# Patient Record
Sex: MALE | Employment: FULL TIME | URBAN - METROPOLITAN AREA
[De-identification: names, ages, dates, MRNs, and addresses within clinical notes are randomized per-mention and may not be internally consistent; named-entity substitution may affect disease eponyms.]

---

## 2024-04-26 ENCOUNTER — APPOINTMENT (OUTPATIENT)
Dept: RADIOLOGY | Facility: HOSPITAL | Age: 59
End: 2024-04-26

## 2024-04-26 ENCOUNTER — HOSPITAL ENCOUNTER (INPATIENT)
Facility: HOSPITAL | Age: 59
LOS: 3 days | Discharge: HOME | End: 2024-04-29
Attending: STUDENT IN AN ORGANIZED HEALTH CARE EDUCATION/TRAINING PROGRAM | Admitting: INTERNAL MEDICINE

## 2024-04-26 ENCOUNTER — APPOINTMENT (OUTPATIENT)
Dept: CARDIOLOGY | Facility: HOSPITAL | Age: 59
End: 2024-04-26

## 2024-04-26 DIAGNOSIS — I10 PRIMARY HYPERTENSION: ICD-10-CM

## 2024-04-26 DIAGNOSIS — I50.9 ACUTE HEART FAILURE, UNSPECIFIED HEART FAILURE TYPE (MULTI): ICD-10-CM

## 2024-04-26 DIAGNOSIS — R06.02 SHORTNESS OF BREATH: ICD-10-CM

## 2024-04-26 DIAGNOSIS — I50.9 HEART FAILURE (MULTI): Primary | ICD-10-CM

## 2024-04-26 DIAGNOSIS — R05.1 ACUTE COUGH: ICD-10-CM

## 2024-04-26 DIAGNOSIS — E78.2 MIXED HYPERLIPIDEMIA: ICD-10-CM

## 2024-04-26 DIAGNOSIS — I21.4 NSTEMI (NON-ST ELEVATED MYOCARDIAL INFARCTION) (MULTI): ICD-10-CM

## 2024-04-26 DIAGNOSIS — I50.43 CHF (CONGESTIVE HEART FAILURE), NYHA CLASS I, ACUTE ON CHRONIC, COMBINED (MULTI): ICD-10-CM

## 2024-04-26 PROBLEM — R06.01 ORTHOPNEA: Status: ACTIVE | Noted: 2024-04-26

## 2024-04-26 LAB
ANION GAP SERPL CALC-SCNC: 11 MMOL/L
AORTIC VALVE MEAN GRADIENT: 5 MMHG
AORTIC VALVE PEAK VELOCITY: 1.56 M/S
AV PEAK GRADIENT: 9.7 MMHG
AVA (PEAK VEL): 2.26 CM2
AVA (VTI): 2.25 CM2
BASOPHILS # BLD AUTO: 0.06 X10*3/UL (ref 0–0.1)
BASOPHILS NFR BLD AUTO: 0.5 %
BUN SERPL-MCNC: 16 MG/DL (ref 8–25)
CALCIUM SERPL-MCNC: 8.7 MG/DL (ref 8.5–10.4)
CHLORIDE SERPL-SCNC: 105 MMOL/L (ref 97–107)
CHOLEST SERPL-MCNC: 193 MG/DL (ref 133–200)
CHOLEST/HDLC SERPL: 4.2 {RATIO}
CO2 SERPL-SCNC: 24 MMOL/L (ref 24–31)
CREAT SERPL-MCNC: 1.1 MG/DL (ref 0.4–1.6)
EGFRCR SERPLBLD CKD-EPI 2021: 79 ML/MIN/1.73M*2
EJECTION FRACTION APICAL 4 CHAMBER: 25.7
EOSINOPHIL # BLD AUTO: 0.15 X10*3/UL (ref 0–0.7)
EOSINOPHIL NFR BLD AUTO: 1.3 %
ERYTHROCYTE [DISTWIDTH] IN BLOOD BY AUTOMATED COUNT: 14.6 % (ref 11.5–14.5)
EST. AVERAGE GLUCOSE BLD GHB EST-MCNC: 131 MG/DL
GLUCOSE BLD MANUAL STRIP-MCNC: 141 MG/DL (ref 74–99)
GLUCOSE BLD MANUAL STRIP-MCNC: 172 MG/DL (ref 74–99)
GLUCOSE BLD MANUAL STRIP-MCNC: 97 MG/DL (ref 74–99)
GLUCOSE SERPL-MCNC: 107 MG/DL (ref 65–99)
HBA1C MFR BLD: 6.2 %
HCT VFR BLD AUTO: 43.8 % (ref 41–52)
HDLC SERPL-MCNC: 46 MG/DL
HGB BLD-MCNC: 14.3 G/DL (ref 13.5–17.5)
IMM GRANULOCYTES # BLD AUTO: 0.04 X10*3/UL (ref 0–0.7)
IMM GRANULOCYTES NFR BLD AUTO: 0.3 % (ref 0–0.9)
LDLC SERPL CALC-MCNC: 128 MG/DL (ref 65–130)
LEFT ATRIUM VOLUME AREA LENGTH INDEX BSA: 29.2 ML/M2
LEFT VENTRICLE INTERNAL DIMENSION DIASTOLE: 5.86 CM (ref 3.5–6)
LEFT VENTRICULAR OUTFLOW TRACT DIAMETER: 2 CM
LV EJECTION FRACTION BIPLANE: 26 %
LYMPHOCYTES # BLD AUTO: 3.19 X10*3/UL (ref 1.2–4.8)
LYMPHOCYTES NFR BLD AUTO: 27.4 %
MCH RBC QN AUTO: 28.1 PG (ref 26–34)
MCHC RBC AUTO-ENTMCNC: 32.6 G/DL (ref 32–36)
MCV RBC AUTO: 86 FL (ref 80–100)
MITRAL VALVE E/A RATIO: 2.54
MITRAL VALVE E/E' RATIO: 22.67
MONOCYTES # BLD AUTO: 0.73 X10*3/UL (ref 0.1–1)
MONOCYTES NFR BLD AUTO: 6.3 %
NEUTROPHILS # BLD AUTO: 7.48 X10*3/UL (ref 1.2–7.7)
NEUTROPHILS NFR BLD AUTO: 64.2 %
NRBC BLD-RTO: 0 /100 WBCS (ref 0–0)
NT-PROBNP SERPL-MCNC: 1059 PG/ML (ref 0–177)
PLATELET # BLD AUTO: 244 X10*3/UL (ref 150–450)
POTASSIUM SERPL-SCNC: 3.7 MMOL/L (ref 3.4–5.1)
RBC # BLD AUTO: 5.09 X10*6/UL (ref 4.5–5.9)
RIGHT VENTRICLE FREE WALL PEAK S': 12.7 CM/S
RIGHT VENTRICLE PEAK SYSTOLIC PRESSURE: 60.8 MMHG
SODIUM SERPL-SCNC: 140 MMOL/L (ref 133–145)
TRICUSPID ANNULAR PLANE SYSTOLIC EXCURSION: 2.4 CM
TRIGL SERPL-MCNC: 94 MG/DL (ref 40–150)
TROPONIN T SERPL-MCNC: 15 NG/L
TROPONIN T SERPL-MCNC: 16 NG/L
TROPONIN T SERPL-MCNC: 16 NG/L
TROPONIN T SERPL-MCNC: 17 NG/L
WBC # BLD AUTO: 11.7 X10*3/UL (ref 4.4–11.3)

## 2024-04-26 PROCEDURE — 84484 ASSAY OF TROPONIN QUANT: CPT | Performed by: STUDENT IN AN ORGANIZED HEALTH CARE EDUCATION/TRAINING PROGRAM

## 2024-04-26 PROCEDURE — 71046 X-RAY EXAM CHEST 2 VIEWS: CPT

## 2024-04-26 PROCEDURE — 99221 1ST HOSP IP/OBS SF/LOW 40: CPT | Performed by: INTERNAL MEDICINE

## 2024-04-26 PROCEDURE — 2500000004 HC RX 250 GENERAL PHARMACY W/ HCPCS (ALT 636 FOR OP/ED): Performed by: STUDENT IN AN ORGANIZED HEALTH CARE EDUCATION/TRAINING PROGRAM

## 2024-04-26 PROCEDURE — 82947 ASSAY GLUCOSE BLOOD QUANT: CPT

## 2024-04-26 PROCEDURE — 2500000004 HC RX 250 GENERAL PHARMACY W/ HCPCS (ALT 636 FOR OP/ED): Performed by: INTERNAL MEDICINE

## 2024-04-26 PROCEDURE — 93306 TTE W/DOPPLER COMPLETE: CPT

## 2024-04-26 PROCEDURE — 93306 TTE W/DOPPLER COMPLETE: CPT | Performed by: INTERNAL MEDICINE

## 2024-04-26 PROCEDURE — 1200000002 HC GENERAL ROOM WITH TELEMETRY DAILY

## 2024-04-26 PROCEDURE — 2500000001 HC RX 250 WO HCPCS SELF ADMINISTERED DRUGS (ALT 637 FOR MEDICARE OP): Performed by: INTERNAL MEDICINE

## 2024-04-26 PROCEDURE — 83036 HEMOGLOBIN GLYCOSYLATED A1C: CPT | Performed by: INTERNAL MEDICINE

## 2024-04-26 PROCEDURE — 96372 THER/PROPH/DIAG INJ SC/IM: CPT | Performed by: INTERNAL MEDICINE

## 2024-04-26 PROCEDURE — 80048 BASIC METABOLIC PNL TOTAL CA: CPT | Performed by: STUDENT IN AN ORGANIZED HEALTH CARE EDUCATION/TRAINING PROGRAM

## 2024-04-26 PROCEDURE — 99285 EMERGENCY DEPT VISIT HI MDM: CPT | Mod: 25

## 2024-04-26 PROCEDURE — 85025 COMPLETE CBC W/AUTO DIFF WBC: CPT | Performed by: STUDENT IN AN ORGANIZED HEALTH CARE EDUCATION/TRAINING PROGRAM

## 2024-04-26 PROCEDURE — 83880 ASSAY OF NATRIURETIC PEPTIDE: CPT | Performed by: STUDENT IN AN ORGANIZED HEALTH CARE EDUCATION/TRAINING PROGRAM

## 2024-04-26 PROCEDURE — 36415 COLL VENOUS BLD VENIPUNCTURE: CPT | Performed by: STUDENT IN AN ORGANIZED HEALTH CARE EDUCATION/TRAINING PROGRAM

## 2024-04-26 PROCEDURE — 71046 X-RAY EXAM CHEST 2 VIEWS: CPT | Performed by: RADIOLOGY

## 2024-04-26 PROCEDURE — 2500000002 HC RX 250 W HCPCS SELF ADMINISTERED DRUGS (ALT 637 FOR MEDICARE OP, ALT 636 FOR OP/ED): Performed by: INTERNAL MEDICINE

## 2024-04-26 PROCEDURE — 96374 THER/PROPH/DIAG INJ IV PUSH: CPT

## 2024-04-26 PROCEDURE — 80061 LIPID PANEL: CPT | Performed by: INTERNAL MEDICINE

## 2024-04-26 PROCEDURE — 93005 ELECTROCARDIOGRAM TRACING: CPT

## 2024-04-26 RX ORDER — METFORMIN HYDROCHLORIDE 500 MG/1
500 TABLET ORAL
COMMUNITY

## 2024-04-26 RX ORDER — LOSARTAN POTASSIUM 50 MG/1
50 TABLET ORAL DAILY
Status: DISCONTINUED | OUTPATIENT
Start: 2024-04-26 | End: 2024-04-29 | Stop reason: HOSPADM

## 2024-04-26 RX ORDER — POLYETHYLENE GLYCOL 3350 17 G/17G
17 POWDER, FOR SOLUTION ORAL DAILY PRN
Status: DISCONTINUED | OUTPATIENT
Start: 2024-04-26 | End: 2024-04-29 | Stop reason: HOSPADM

## 2024-04-26 RX ORDER — CARVEDILOL 3.12 MG/1
3.12 TABLET ORAL
Status: DISCONTINUED | OUTPATIENT
Start: 2024-04-26 | End: 2024-04-29 | Stop reason: HOSPADM

## 2024-04-26 RX ORDER — ASPIRIN 81 MG/1
81 TABLET ORAL DAILY
Status: DISCONTINUED | OUTPATIENT
Start: 2024-04-27 | End: 2024-04-29 | Stop reason: HOSPADM

## 2024-04-26 RX ORDER — INSULIN LISPRO 100 [IU]/ML
0-10 INJECTION, SOLUTION INTRAVENOUS; SUBCUTANEOUS
Status: DISCONTINUED | OUTPATIENT
Start: 2024-04-26 | End: 2024-04-29 | Stop reason: HOSPADM

## 2024-04-26 RX ORDER — AMLODIPINE BESYLATE 5 MG/1
5 TABLET ORAL DAILY
Status: DISCONTINUED | OUTPATIENT
Start: 2024-04-26 | End: 2024-04-29 | Stop reason: HOSPADM

## 2024-04-26 RX ORDER — ACETAMINOPHEN 325 MG/1
650 TABLET ORAL EVERY 4 HOURS PRN
Status: DISCONTINUED | OUTPATIENT
Start: 2024-04-26 | End: 2024-04-29 | Stop reason: HOSPADM

## 2024-04-26 RX ORDER — LOSARTAN POTASSIUM 50 MG/1
50 TABLET ORAL DAILY
COMMUNITY

## 2024-04-26 RX ORDER — HYDRALAZINE HYDROCHLORIDE 20 MG/ML
5 INJECTION INTRAMUSCULAR; INTRAVENOUS EVERY 8 HOURS PRN
Status: DISCONTINUED | OUTPATIENT
Start: 2024-04-26 | End: 2024-04-29 | Stop reason: HOSPADM

## 2024-04-26 RX ORDER — ACETAMINOPHEN 650 MG/1
650 SUPPOSITORY RECTAL EVERY 4 HOURS PRN
Status: DISCONTINUED | OUTPATIENT
Start: 2024-04-26 | End: 2024-04-29 | Stop reason: HOSPADM

## 2024-04-26 RX ORDER — ATORVASTATIN CALCIUM 10 MG/1
10 TABLET, FILM COATED ORAL NIGHTLY
Status: DISCONTINUED | OUTPATIENT
Start: 2024-04-26 | End: 2024-04-26

## 2024-04-26 RX ORDER — DEXTROSE 50 % IN WATER (D50W) INTRAVENOUS SYRINGE
12.5
Status: DISCONTINUED | OUTPATIENT
Start: 2024-04-26 | End: 2024-04-29 | Stop reason: HOSPADM

## 2024-04-26 RX ORDER — GUAIFENESIN/DEXTROMETHORPHAN 100-10MG/5
5 SYRUP ORAL EVERY 4 HOURS PRN
Status: DISCONTINUED | OUTPATIENT
Start: 2024-04-26 | End: 2024-04-29 | Stop reason: HOSPADM

## 2024-04-26 RX ORDER — ONDANSETRON HYDROCHLORIDE 2 MG/ML
4 INJECTION, SOLUTION INTRAVENOUS EVERY 8 HOURS PRN
Status: DISCONTINUED | OUTPATIENT
Start: 2024-04-26 | End: 2024-04-29 | Stop reason: HOSPADM

## 2024-04-26 RX ORDER — DEXTROSE 50 % IN WATER (D50W) INTRAVENOUS SYRINGE
25
Status: DISCONTINUED | OUTPATIENT
Start: 2024-04-26 | End: 2024-04-29 | Stop reason: HOSPADM

## 2024-04-26 RX ORDER — ATORVASTATIN CALCIUM 40 MG/1
40 TABLET, FILM COATED ORAL NIGHTLY
Status: DISCONTINUED | OUTPATIENT
Start: 2024-04-26 | End: 2024-04-29 | Stop reason: HOSPADM

## 2024-04-26 RX ORDER — ACETAMINOPHEN 160 MG/5ML
650 SOLUTION ORAL EVERY 4 HOURS PRN
Status: DISCONTINUED | OUTPATIENT
Start: 2024-04-26 | End: 2024-04-29 | Stop reason: HOSPADM

## 2024-04-26 RX ORDER — NAPROXEN SODIUM 220 MG/1
325 TABLET, FILM COATED ORAL ONCE
Status: COMPLETED | OUTPATIENT
Start: 2024-04-26 | End: 2024-04-26

## 2024-04-26 RX ORDER — FAMOTIDINE 20 MG/1
20 TABLET, FILM COATED ORAL DAILY
Status: DISCONTINUED | OUTPATIENT
Start: 2024-04-26 | End: 2024-04-29 | Stop reason: HOSPADM

## 2024-04-26 RX ORDER — GUAIFENESIN 600 MG/1
600 TABLET, EXTENDED RELEASE ORAL EVERY 12 HOURS PRN
Status: DISCONTINUED | OUTPATIENT
Start: 2024-04-26 | End: 2024-04-29 | Stop reason: HOSPADM

## 2024-04-26 RX ORDER — ENOXAPARIN SODIUM 100 MG/ML
40 INJECTION SUBCUTANEOUS DAILY
Status: DISCONTINUED | OUTPATIENT
Start: 2024-04-26 | End: 2024-04-29 | Stop reason: HOSPADM

## 2024-04-26 RX ORDER — FUROSEMIDE 10 MG/ML
20 INJECTION INTRAMUSCULAR; INTRAVENOUS ONCE
Status: COMPLETED | OUTPATIENT
Start: 2024-04-26 | End: 2024-04-26

## 2024-04-26 RX ORDER — ONDANSETRON 4 MG/1
4 TABLET, ORALLY DISINTEGRATING ORAL EVERY 8 HOURS PRN
Status: DISCONTINUED | OUTPATIENT
Start: 2024-04-26 | End: 2024-04-29 | Stop reason: HOSPADM

## 2024-04-26 RX ADMIN — CARVEDILOL 3.12 MG: 3.12 TABLET, FILM COATED ORAL at 10:13

## 2024-04-26 RX ADMIN — INSULIN LISPRO 2 UNITS: 100 INJECTION, SOLUTION INTRAVENOUS; SUBCUTANEOUS at 12:37

## 2024-04-26 RX ADMIN — ATORVASTATIN CALCIUM 40 MG: 40 TABLET, FILM COATED ORAL at 20:17

## 2024-04-26 RX ADMIN — CARVEDILOL 3.12 MG: 3.12 TABLET, FILM COATED ORAL at 17:39

## 2024-04-26 RX ADMIN — ASPIRIN 325 MG: 81 TABLET, CHEWABLE ORAL at 10:12

## 2024-04-26 RX ADMIN — FAMOTIDINE 20 MG: 20 TABLET ORAL at 10:12

## 2024-04-26 RX ADMIN — AMLODIPINE BESYLATE 5 MG: 5 TABLET ORAL at 10:12

## 2024-04-26 RX ADMIN — ENOXAPARIN SODIUM 40 MG: 40 INJECTION SUBCUTANEOUS at 10:12

## 2024-04-26 RX ADMIN — LOSARTAN POTASSIUM 50 MG: 50 TABLET, FILM COATED ORAL at 09:15

## 2024-04-26 RX ADMIN — FUROSEMIDE 20 MG: 10 INJECTION, SOLUTION INTRAMUSCULAR; INTRAVENOUS at 05:52

## 2024-04-26 SDOH — ECONOMIC STABILITY: TRANSPORTATION INSECURITY
IN THE PAST 12 MONTHS, HAS LACK OF TRANSPORTATION KEPT YOU FROM MEETINGS, WORK, OR FROM GETTING THINGS NEEDED FOR DAILY LIVING?: PATIENT UNABLE TO ANSWER

## 2024-04-26 SDOH — ECONOMIC STABILITY: TRANSPORTATION INSECURITY
IN THE PAST 12 MONTHS, HAS THE LACK OF TRANSPORTATION KEPT YOU FROM MEDICAL APPOINTMENTS OR FROM GETTING MEDICATIONS?: PATIENT UNABLE TO ANSWER

## 2024-04-26 SDOH — HEALTH STABILITY: PHYSICAL HEALTH
ON AVERAGE, HOW MANY DAYS PER WEEK DO YOU ENGAGE IN MODERATE TO STRENUOUS EXERCISE (LIKE A BRISK WALK)?: PATIENT UNABLE TO ANSWER

## 2024-04-26 SDOH — ECONOMIC STABILITY: INCOME INSECURITY
HOW HARD IS IT FOR YOU TO PAY FOR THE VERY BASICS LIKE FOOD, HOUSING, MEDICAL CARE, AND HEATING?: PATIENT UNABLE TO ANSWER

## 2024-04-26 SDOH — ECONOMIC STABILITY: FOOD INSECURITY
WITHIN THE PAST 12 MONTHS, THE FOOD YOU BOUGHT JUST DIDN'T LAST AND YOU DIDN'T HAVE MONEY TO GET MORE.: PATIENT UNABLE TO ANSWER

## 2024-04-26 SDOH — HEALTH STABILITY: MENTAL HEALTH: HOW MANY STANDARD DRINKS CONTAINING ALCOHOL DO YOU HAVE ON A TYPICAL DAY?: PATIENT DOES NOT DRINK

## 2024-04-26 SDOH — ECONOMIC STABILITY: HOUSING INSECURITY: IN THE LAST 12 MONTHS, HOW MANY PLACES HAVE YOU LIVED?: 1

## 2024-04-26 SDOH — HEALTH STABILITY: PHYSICAL HEALTH: ON AVERAGE, HOW MANY MINUTES DO YOU ENGAGE IN EXERCISE AT THIS LEVEL?: PATIENT UNABLE TO ANSWER

## 2024-04-26 SDOH — ECONOMIC STABILITY: INCOME INSECURITY
IN THE LAST 12 MONTHS, WAS THERE A TIME WHEN YOU WERE NOT ABLE TO PAY THE MORTGAGE OR RENT ON TIME?: PATIENT UNABLE TO ANSWER

## 2024-04-26 SDOH — ECONOMIC STABILITY: FOOD INSECURITY
WITHIN THE PAST 12 MONTHS, YOU WORRIED THAT YOUR FOOD WOULD RUN OUT BEFORE YOU GOT MONEY TO BUY MORE.: PATIENT UNABLE TO ANSWER

## 2024-04-26 SDOH — SOCIAL STABILITY: SOCIAL INSECURITY: HAVE YOU HAD THOUGHTS OF HARMING ANYONE ELSE?: NO

## 2024-04-26 SDOH — SOCIAL STABILITY: SOCIAL INSECURITY: DOES ANYONE TRY TO KEEP YOU FROM HAVING/CONTACTING OTHER FRIENDS OR DOING THINGS OUTSIDE YOUR HOME?: NO

## 2024-04-26 SDOH — SOCIAL STABILITY: SOCIAL INSECURITY: ARE YOU OR HAVE YOU BEEN THREATENED OR ABUSED PHYSICALLY, EMOTIONALLY, OR SEXUALLY BY ANYONE?: NO

## 2024-04-26 SDOH — HEALTH STABILITY: MENTAL HEALTH: HOW OFTEN DO YOU HAVE A DRINK CONTAINING ALCOHOL?: NEVER

## 2024-04-26 SDOH — SOCIAL STABILITY: SOCIAL INSECURITY: HAS ANYONE EVER THREATENED TO HURT YOUR FAMILY OR YOUR PETS?: NO

## 2024-04-26 SDOH — SOCIAL STABILITY: SOCIAL INSECURITY: DO YOU FEEL UNSAFE GOING BACK TO THE PLACE WHERE YOU ARE LIVING?: NO

## 2024-04-26 SDOH — HEALTH STABILITY: MENTAL HEALTH: HOW OFTEN DO YOU HAVE 6 OR MORE DRINKS ON ONE OCCASION?: NEVER

## 2024-04-26 SDOH — SOCIAL STABILITY: SOCIAL INSECURITY: ARE THERE ANY APPARENT SIGNS OF INJURIES/BEHAVIORS THAT COULD BE RELATED TO ABUSE/NEGLECT?: NO

## 2024-04-26 SDOH — ECONOMIC STABILITY: HOUSING INSECURITY
IN THE LAST 12 MONTHS, WAS THERE A TIME WHEN YOU DID NOT HAVE A STEADY PLACE TO SLEEP OR SLEPT IN A SHELTER (INCLUDING NOW)?: PATIENT UNABLE TO ANSWER

## 2024-04-26 SDOH — SOCIAL STABILITY: SOCIAL INSECURITY: DO YOU FEEL ANYONE HAS EXPLOITED OR TAKEN ADVANTAGE OF YOU FINANCIALLY OR OF YOUR PERSONAL PROPERTY?: NO

## 2024-04-26 SDOH — HEALTH STABILITY: MENTAL HEALTH
STRESS IS WHEN SOMEONE FEELS TENSE, NERVOUS, ANXIOUS, OR CAN'T SLEEP AT NIGHT BECAUSE THEIR MIND IS TROUBLED. HOW STRESSED ARE YOU?: PATIENT UNABLE TO ANSWER

## 2024-04-26 SDOH — HEALTH STABILITY: MENTAL HEALTH
HOW OFTEN DO YOU NEED TO HAVE SOMEONE HELP YOU WHEN YOU READ INSTRUCTIONS, PAMPHLETS, OR OTHER WRITTEN MATERIAL FROM YOUR DOCTOR OR PHARMACY?: PATIENT UNABLE TO RESPOND

## 2024-04-26 SDOH — SOCIAL STABILITY: SOCIAL INSECURITY: ABUSE: ADULT

## 2024-04-26 SDOH — SOCIAL STABILITY: SOCIAL INSECURITY: WERE YOU ABLE TO COMPLETE ALL THE BEHAVIORAL HEALTH SCREENINGS?: YES

## 2024-04-26 SDOH — SOCIAL STABILITY: SOCIAL INSECURITY: HAVE YOU HAD ANY THOUGHTS OF HARMING ANYONE ELSE?: NO

## 2024-04-26 ASSESSMENT — ACTIVITIES OF DAILY LIVING (ADL)
BATHING: INDEPENDENT
BATHING: INDEPENDENT
DRESSING YOURSELF: INDEPENDENT
PATIENT'S MEMORY ADEQUATE TO SAFELY COMPLETE DAILY ACTIVITIES?: YES
HEARING - RIGHT EAR: FUNCTIONAL
FEEDING YOURSELF: INDEPENDENT
WALKS IN HOME: INDEPENDENT
FEEDING YOURSELF: INDEPENDENT
JUDGMENT_ADEQUATE_SAFELY_COMPLETE_DAILY_ACTIVITIES: YES
HEARING - LEFT EAR: FUNCTIONAL
JUDGMENT_ADEQUATE_SAFELY_COMPLETE_DAILY_ACTIVITIES: YES
PATIENT'S MEMORY ADEQUATE TO SAFELY COMPLETE DAILY ACTIVITIES?: YES
TOILETING: INDEPENDENT
HEARING - LEFT EAR: FUNCTIONAL
GROOMING: INDEPENDENT
WALKS IN HOME: INDEPENDENT
TOILETING: INDEPENDENT
GROOMING: INDEPENDENT
LACK_OF_TRANSPORTATION: PATIENT UNABLE TO ANSWER
DRESSING YOURSELF: INDEPENDENT
ADEQUATE_TO_COMPLETE_ADL: YES
ADEQUATE_TO_COMPLETE_ADL: YES
HEARING - RIGHT EAR: FUNCTIONAL

## 2024-04-26 ASSESSMENT — COGNITIVE AND FUNCTIONAL STATUS - GENERAL
DAILY ACTIVITIY SCORE: 24
MOBILITY SCORE: 24
PATIENT BASELINE BEDBOUND: NO
DAILY ACTIVITIY SCORE: 24
MOBILITY SCORE: 24
MOBILITY SCORE: 24
DAILY ACTIVITIY SCORE: 24

## 2024-04-26 ASSESSMENT — PAIN SCALES - GENERAL
PAINLEVEL_OUTOF10: 9
PAINLEVEL_OUTOF10: 0 - NO PAIN
PAINLEVEL_OUTOF10: 0 - NO PAIN

## 2024-04-26 ASSESSMENT — ENCOUNTER SYMPTOMS
MYALGIAS: 0
IRREGULAR HEARTBEAT: 0
DIZZINESS: 0
SHORTNESS OF BREATH: 1
PALPITATIONS: 0
NEAR-SYNCOPE: 0
ORTHOPNEA: 0
WEIGHT LOSS: 0
WHEEZING: 0
WEAKNESS: 0
WEIGHT GAIN: 0
COUGH: 0
CLAUDICATION: 0
PND: 0
SYNCOPE: 0
DIAPHORESIS: 0
DYSPNEA ON EXERTION: 1
FEVER: 0

## 2024-04-26 ASSESSMENT — LIFESTYLE VARIABLES
AUDIT-C TOTAL SCORE: 0
AUDIT-C TOTAL SCORE: 0
PRESCIPTION_ABUSE_PAST_12_MONTHS: NO
HOW OFTEN DO YOU HAVE A DRINK CONTAINING ALCOHOL: NEVER
HOW OFTEN DO YOU HAVE 6 OR MORE DRINKS ON ONE OCCASION: NEVER
SKIP TO QUESTIONS 9-10: 1
AUDIT-C TOTAL SCORE: 0
SUBSTANCE_ABUSE_PAST_12_MONTHS: NO
SKIP TO QUESTIONS 9-10: 1
HOW MANY STANDARD DRINKS CONTAINING ALCOHOL DO YOU HAVE ON A TYPICAL DAY: PATIENT DOES NOT DRINK

## 2024-04-26 ASSESSMENT — PATIENT HEALTH QUESTIONNAIRE - PHQ9
2. FEELING DOWN, DEPRESSED OR HOPELESS: NOT AT ALL
1. LITTLE INTEREST OR PLEASURE IN DOING THINGS: NOT AT ALL
SUM OF ALL RESPONSES TO PHQ9 QUESTIONS 1 & 2: 0

## 2024-04-26 ASSESSMENT — COLUMBIA-SUICIDE SEVERITY RATING SCALE - C-SSRS
2. HAVE YOU ACTUALLY HAD ANY THOUGHTS OF KILLING YOURSELF?: NO
1. IN THE PAST MONTH, HAVE YOU WISHED YOU WERE DEAD OR WISHED YOU COULD GO TO SLEEP AND NOT WAKE UP?: NO
6. HAVE YOU EVER DONE ANYTHING, STARTED TO DO ANYTHING, OR PREPARED TO DO ANYTHING TO END YOUR LIFE?: NO
1. IN THE PAST MONTH, HAVE YOU WISHED YOU WERE DEAD OR WISHED YOU COULD GO TO SLEEP AND NOT WAKE UP?: NO
2. HAVE YOU ACTUALLY HAD ANY THOUGHTS OF KILLING YOURSELF?: NO
6. HAVE YOU EVER DONE ANYTHING, STARTED TO DO ANYTHING, OR PREPARED TO DO ANYTHING TO END YOUR LIFE?: NO

## 2024-04-26 ASSESSMENT — PAIN - FUNCTIONAL ASSESSMENT: PAIN_FUNCTIONAL_ASSESSMENT: 0-10

## 2024-04-26 NOTE — H&P
History Of Present Illness      Tomas Hough is a 58 y.o. male presenting with Shortness of Breath.      Patient presented with shortness of breath.  He stated he felt like something was stuck in his throat.  This has been ongoing for over 1 week.  It seems to be worse when the patient lays down at night or when he is active.  Corwin has been wheezing as well.  He is otherwise healthy.  He does not smoke.      Patient's ED diagnostic workup noted for minimal leukocytosis of 11.7.  The H&H was stable at 14.3/43.8.  The platelet count was stable at 244.  The  differential count was unremarkable.  The blood chemistry was noted for a glucose of 107.  Otherwise no electrolyte aberrancies.  Patient's proBNP was 1059.  PA and lateral chest x-ray was noted for cardiomegaly.  Mild vascular congestion and interstitial edema.      Vital signs in the ED were noted for Tmax 37.2, pulse rate 89, respiratory 14, /78.  Saturating 94% on room air.       Patient's EKG noted for normal sinus rhythm at 77 bpm.  Left axis deviation.  Left bundle branch block.  This was per the ED physician        I used the [employer] device at the bedside in the emergency room with a  name [place] to have a conversation with the patient.        Past Medical History      Past Medical History:   Diagnosis Date    Hypertension          Surgical History        History reviewed. No pertinent surgical history.       Social History    He has no history on file for tobacco use, alcohol use, and drug use.    Ex-smoker he quit over 25 years ago.  He only smoked a few cigarettes per day    He will drink 1 beer occasionally throughout the week      Family History      Mother: Diabetes mellitus type 2  Father: Healthy       Allergies      Penicillin allergy he states he gets hives.            Review of Systems    14-point ROS otherwise negative, as per HPI/Interval History.    General: No change in weight. No weakenss. No Fevers/Chills/Night Sweats  "  Skin: No skin/hair/nail changes. No rashes or sores.  Head:  No trauma. No Headache/nasuea/vomitting.   Eyes: No visual changes. No tearing. No itching.   Ears: No hearing loss. No tinnitus. No vertigo. No discharge.  Nose, Sinuses: No rhinorrhea, No nasal congestion. No epistaxis.  Mouth, Throat, Neck: No bleeding gums, hoarseness, sore throat or swollen neck  Cardiac: No palpitations. No PERES. No PND. No Orthopnea.   Respiratory: Yes Shortness of Breath. No wheezing. Yes cough. No hemoptysis.   GI: No nausea/vomiting. No indigestion. No diarrhea. No constipation.   Extremities: No numbness or tingling. No paresthesias.   Urinary: No change in urinary frequency. No change in hesitancy. No hematuria. No incontinence.           Physical Exam        Constitutional:  Pleasant  Eyes: PERRL, EOMI,   ENMT: mucous membranes moist  Head/Neck: Neck supple, Minimal JVD,   Respiratory/Thorax: Fine crackles in the bases  Cardiovascular: Regular, rate and rhythm, no murmurs.  Orthopnea   Gastrointestinal: Soft, non-distended, +BS.  Musculoskeletal: ROM intact, no joint swelling, normal strength  Extremities: peripheral pulses intact; no edema  Neurological: Alert and Oriented x 3; no focal deficits; gross motor and sensation intact; CN II-XII intact. No asterixis.  Psychological: Appropriate mood and behavior  Skin: No lesions, No rashes.         Last Recorded Vitals  Blood pressure 162/76, pulse 79, temperature 37.2 °C (99 °F), temperature source Oral, resp. rate 17, height 1.727 m (5' 8\"), weight 81.6 kg (180 lb), SpO2 99%.    Relevant Results    Lab Results   Component Value Date    WBC 11.7 (H) 04/26/2024    HGB 14.3 04/26/2024    HCT 43.8 04/26/2024    MCV 86 04/26/2024     04/26/2024       Lab Results   Component Value Date    GLUCOSE 107 (H) 04/26/2024    CALCIUM 8.7 04/26/2024     04/26/2024    K 3.7 04/26/2024    CO2 24 04/26/2024     04/26/2024    BUN 16 04/26/2024    CREATININE 1.10 04/26/2024 " "      No results found for: \"HGBA1C\"      No CT head results found for the past 12 months      Scheduled medications  amLODIPine, 5 mg, oral, Daily      Continuous medications     PRN medications  PRN medications: hydrALAZINE        Assessment/Plan   Principal Problem:    CHF (congestive heart failure), NYHA class I, acute on chronic, combined (Multi)  Active Problems:    Orthopnea    HTN (hypertension)        Tomas Hough is a 58 y.o. male presenting with Shortness of Breath.  Patient admitted for further evaluation and management.        Decompensated Heart Failure -patient complains of orthopnea      Admit patient to Telemetry Services  Chest x-ray reviewed  Will administer diuretics Lasix 20 mg IVP Q Daily.  Obtain TTE to evaluate for most recent LVEF and to asses for any valvular deformities, or regional wall motion defects.  Low Sodium Diet  Fluid Restriction < 1.5 Liter/day  Daily Weights  Strict I's + O's  Cardiac Diet   EKG reviewed  Follow-up lipid panel and hemoglobin A1c level      Left bundle branch block      No previous EKG to compare with  Denies any active chest pain      Uncontrolled Hypertension      Continue to monitor BP and adjust hypertensive medications accordingly  Will start patient amlodipine 5 mg p.o. daily      GI + DVT PPX      H2 blocker  Lovenox subcu          This Dictation was Transcribed using a Nuance Dragon Voice Recognition System Device (with Compatible Computer + Software) and as such may contain Grammatical Errors and Unintentional Typing Misprints.      I spent 31 minutes in the professional and overall care of this patient.      Andrew Mayers MD      "

## 2024-04-26 NOTE — CONSULTS
"Nutrition Assessement Note    Nutrition Assessment    Reason for Assessment: Dietitian discretion (CHF)    Reason for Hospital Admission:  Tomas Hough is a 58 y.o. male who is admitted for CHF. Bolivian speaking - use of virtual  during this visit. Via intrepeter, pt asked if handouts were paperwork he needed to fill out. When asked if he could read the informaiton he answered yes.     Nutrition History:  Food and Nutrient History: reports good appetite. adds a little salt to his foods at home.  Energy Intake: Good > 75 %    Anthropometrics:  Ht: 172.7 cm (5' 8\"), Wt: 81.6 kg (180 lb), BMI: 27.38  IBW/kg (Dietitian Calculated): 70 kg  Percent of IBW: 117 %    Weight Change:  Daily Weight  04/26/24 : 81.6 kg (180 lb)     Weight History / % Weight Change: denies wt changes but reports usual wt 209#    Nutrition Focused Physical Exam Findings:   Subcutaneous Fat Loss  Orbital Fat Pads: Well nourished (slightly bulging fat pads)  Buccal Fat Pads: Well nourished (full, rounded cheeks)    Muscle Wasting  Temporalis: Well nourished (well-defined muscle)  Pectoralis (Clavicular Region): Well nourished (clavicle not visible)  Deltoid/Trapezius: Well nourished (rounded appearance at arm, shoulder, neck)    Nutrition Significant Labs:  Lab Results   Component Value Date    WBC 11.7 (H) 04/26/2024    HGB 14.3 04/26/2024    HCT 43.8 04/26/2024     04/26/2024    CHOL 193 04/26/2024    TRIG 94 04/26/2024    HDL 46.0 04/26/2024     04/26/2024    K 3.7 04/26/2024     04/26/2024    CREATININE 1.10 04/26/2024    BUN 16 04/26/2024    CO2 24 04/26/2024    HGBA1C 6.2 (H) 04/26/2024     Nutrition Specific Medications:  amLODIPine, 5 mg, oral, Daily  [START ON 4/27/2024] aspirin, 81 mg, oral, Daily  atorvastatin, 40 mg, oral, Nightly  carvedilol, 3.125 mg, oral, BID with meals  enoxaparin, 40 mg, subcutaneous, Daily  famotidine, 20 mg, oral, Daily  insulin lispro, 0-10 Units, subcutaneous, TID with " meals  losartan, 50 mg, oral, Daily  perflutren lipid microspheres, 0.5-10 mL of dilution, intravenous, Once in imaging  perflutren protein A microsphere, 0.5 mL, intravenous, Once in imaging  sulfur hexafluoride microsphr, 2 mL, intravenous, Once in imaging      Dietary Orders (From admission, onward)       Start     Ordered    04/26/24 0615  Adult diet Cardiac; 70 gm fat; 2 - 3 grams Sodium; 1600 mL fluid  Diet effective now        Question Answer Comment   Diet type Cardiac    Fat restriction: 70 gm fat    Sodium restriction: 2 - 3 grams Sodium    Dietary fluid restriction / 24h: 1600 mL fluid        04/26/24 0615                  Estimated Needs:   Estimated Energy Needs  Total Energy Estimated Needs (kCal): 1750 kCal  Total Estimated Energy Need per Day (kCal/kg): 25 kCal/kg  Method for Estimating Needs: IBW    Estimated Protein Needs  Total Protein Estimated Needs (g): 84 g  Total Protein Estimated Needs (g/kg): 1.2 g/kg  Method for Estimating Needs: IBW    Estimated Fluid Needs  Total Fluid Estimated Needs (mL): 1600 mL  Method for Estimating Needs: FR        Nutrition Diagnosis   Nutrition Diagnosis:  Malnutrition Diagnosis  Patient has Malnutrition Diagnosis: No    Nutrition Diagnosis  Patient has Nutrition Diagnosis: Yes  Diagnosis Status (1): New  Nutrition Diagnosis 1: Food and nutrition related knowledge deficit  Related to (1): lack of prior exposure to accurate nutrition related information  As Evidenced by (1): request for education       Nutrition Interventions/Recommendations   Nutrition Interventions and Recommendations:    Nutrition Prescription:  Individualized Nutrition Prescription Provided for : 1750 kcals and 84g protein to be provided via diet    Nutrition Interventions:   Food and/or Nutrient Delivery Interventions  Interventions: Meals and snacks  Meals and Snacks: Fat-modified diet, Fluid-modified diet, Mineral-modified diet  Goal: provide as ordered  Additional Interventions: reviewed  low Na+ diet. handouts provided.    Education Documentation  Diet - Low Sodium, taught by Fern Stockton, YOLI, LD at 4/26/2024  2:20 PM.  Learner: Patient  Readiness: Acceptance  Method: Explanation, Handout  Response: Verbalizes Understanding  Comment: provided handout in [place]. use of intrepreter to assist in education.             Nutrition Monitoring and Evaluation   Monitoring/Evaluation:   Food/Nutrient Related History Monitoring  Monitoring and Evaluation Plan: Energy intake  Energy Intake: Estimated energy intake  Criteria: pt to consume >/= 75% estimated needs       Time Spent/Follow-up:   Follow Up  Time Spent (min): 35 minutes  Last Date of Nutrition Visit: 04/26/24  Nutrition Follow-Up Needed?: 7-10 days  Follow up Comment: 5/3/24

## 2024-04-26 NOTE — CONSULTS
Inpatient consult to Cardiology  Consult performed by: Kyle De La Cruz DO  Consult ordered by: Andrew Mayers MD  Reason for consult: CHF        History Of Present Illness:    Tomas Hough is a 58 y.o. male presenting with shortness of breath. HPI was obtained from him using the virtual .  He presents after several days of shortness of breath and what he describes as a fullness in his throat and chest.  He does admit to chest pain on and off.  It sounds like he has a history of diabetes mellitus type 2, he is on metformin does not have a primary care physician and gets his medications from [facility].  In the emergency room vitals were and are stable.  He is saturating well on room air.  He has a chest x-ray that states some vascular congestion.  His laboratory analysis shows reasonable kidney function with a serum creatinine of 1.1.  proBNP is elevated at just over 1000, high-sensitivity troponins are marginal at 17, 17, and 16.  His EKG is without ischemic change.    Review of Systems   Constitutional: Negative for diaphoresis, fever, weight gain and weight loss.   Eyes:  Negative for visual disturbance.   Cardiovascular:  Positive for chest pain and dyspnea on exertion. Negative for claudication, irregular heartbeat, leg swelling, near-syncope, orthopnea, palpitations, paroxysmal nocturnal dyspnea and syncope.   Respiratory:  Positive for shortness of breath. Negative for cough and wheezing.    Musculoskeletal:  Negative for muscle weakness and myalgias.   Neurological:  Negative for dizziness and weakness.   All other systems reviewed and are negative.         Last Recorded Vitals:  Vitals:    04/26/24 0057 04/26/24 0200 04/26/24 0400 04/26/24 0655   BP: 175/78 158/70 162/76 (!) 162/99   BP Location: Left arm Right arm Right arm Left arm   Patient Position: Sitting Lying  Sitting   Pulse: 89 75 79 90   Resp: 14 15 17 18   Temp: 37.2 °C (99 °F)   36.8 °C (98.2 °F)   TempSrc: Oral   Oral   SpO2: 94% 98% 99%  "96%   Weight: 81.6 kg (180 lb)      Height: 1.727 m (5' 8\")          Last Labs:  CBC - 4/26/2024:  2:54 AM  11.7 14.3 244    43.8      CMP - 4/26/2024:  4:41 AM  8.7 _ _ --- _   _ _ _ _      PTT - No results in last year.  _   _ _     No results found for: \"TROPHS\", \"BNP\", \"HGBA1C\", \"LDLCALC\", \"VLDL\"   Last I/O:  No intake/output data recorded.    Past Cardiology Tests (Last 3 Years):  EKG:  No results found for this or any previous visit from the past 1095 days.    Echo:  Transthoracic Echo (TTE) Complete 04/26/2024    Ejection Fractions:  No results found for: \"EF\"  Cath:  No results found for this or any previous visit from the past 1095 days.    Stress Test:  No results found for this or any previous visit from the past 1095 days.    Cardiac Imaging:  No results found for this or any previous visit from the past 1095 days.      Past Medical History:  He has a past medical history of Diabetes mellitus (Multi) and Hypertension.    Past Surgical History:  He has no past surgical history on file.      Social History:  He has no history on file for tobacco use, alcohol use, and drug use.    Family History:  No family history on file.     Allergies:  Penicillins    Inpatient Medications:  Scheduled medications   Medication Dose Route Frequency    amLODIPine  5 mg oral Daily    enoxaparin  40 mg subcutaneous Daily    famotidine  20 mg oral Daily    insulin lispro  0-10 Units subcutaneous TID with meals    losartan  50 mg oral Daily    perflutren lipid microspheres  0.5-10 mL of dilution intravenous Once in imaging    perflutren protein A microsphere  0.5 mL intravenous Once in imaging    sulfur hexafluoride microsphr  2 mL intravenous Once in imaging     PRN medications   Medication    acetaminophen    Or    acetaminophen    Or    acetaminophen    benzocaine-menthol    dextromethorphan-guaifenesin    dextrose    dextrose    glucagon    glucagon    guaiFENesin    hydrALAZINE    ondansetron ODT    Or    ondansetron    " polyethylene glycol     Continuous Medications   Medication Dose Last Rate     Outpatient Medications:  Current Outpatient Medications   Medication Instructions    losartan (COZAAR) 50 mg, oral, Daily    metFORMIN (GLUCOPHAGE) 500 mg, oral, Daily with breakfast       Physical Exam:   Gen: NAD   Neck: no JVD, carotid upstroke is brisk and without delay   Heart: rrr, s1s2+ no mrg   Lungs: CTA   Ext: warm no edema     Assessment/Plan   Late presenting anterior wall myocardial infarction  Acute systolic heart failure  Diabetes mellitus type 2  Myocardial injury    4/26: His echocardiogram shows a dilated cardiomyopathy.  There is moderate global hypokinesis with severe anterior, anteroseptal, and apical hypokinesis.  Ejection fraction is approximately 25 to 30%.  These findings are consistent with a late presenting anterior wall myocardial infarction with subsequent acute systolic heart failure.  Will start him on carvedilol and losartan.  Would like to proceed with coronary angiography as early as Monday.  Will continue to hold his metformin.  Will start atorvastatin and aspirin.  As for his volume status he did receive 1 dose of IV Lasix with unclear diuretic response.  I feel that he is euvolemic on exam today I would not diurese further.  Will continue to follow this patient with you.       Code Status:  Full Code    I spent 45 minutes in the professional and overall care of this patient.        Kyle De La Cruz, DO

## 2024-04-26 NOTE — PROGRESS NOTES
04/26/24 1508   Physical Activity   On average, how many days per week do you engage in moderate to strenuous exercise (like a brisk walk)? Pt Unable   On average, how many minutes do you engage in exercise at this level? Pt Unable   Financial Resource Strain   How hard is it for you to pay for the very basics like food, housing, medical care, and heating? Pt Unable   Housing Stability   In the last 12 months, was there a time when you were not able to pay the mortgage or rent on time? Pt Unable   In the last 12 months, was there a time when you did not have a steady place to sleep or slept in a shelter (including now)? Pt Unable   Transportation Needs   In the past 12 months, has lack of transportation kept you from medical appointments or from getting medications? Pt Unable   In the past 12 months, has lack of transportation kept you from meetings, work, or from getting things needed for daily living? Pt Unable   Food Insecurity   Within the past 12 months, you worried that your food would run out before you got the money to buy more. Pt Unable   Within the past 12 months, the food you bought just didn't last and you didn't have money to get more. Pt Unable   Stress   Do you feel stress - tense, restless, nervous, or anxious, or unable to sleep at night because your mind is troubled all the time - these days? Pt Unable   Alcohol Use   Q1: How often do you have a drink containing alcohol? Never   Q2: How many drinks containing alcohol do you have on a typical day when you are drinking? None   Q3: How often do you have six or more drinks on one occasion? Never   Health Literacy   How often do you need to have someone help you when you read instructions, pamphlets, or other written material from your doctor or pharmacy? Patient unable to respond

## 2024-04-26 NOTE — PROGRESS NOTES
Patient seen several times today with [facility]  as well as nurse .  Explained in detail his current medical findings and plan.  Patient is more comfortable after several discussions.  Asked social work to investigate patient's insurance options, apparently he had Medicaid in [place].  Appreciate cardiology input, will follow recs over the weekend will be optimizing medical management in the setting of pending cardiac catheterization likely Monday.  Patient aware of procedure was explained in Turkmen this procedure.  All questions were answered

## 2024-04-26 NOTE — PROGRESS NOTES
04/26/24 1503   [facility] Disability Status   Are you deaf or do you have serious difficulty hearing? N   Are you blind or do you have serious difficulty seeing, even when wearing glasses? N   Because of a physical, mental, or emotional condition, do you have serious difficulty concentrating, remembering, or making decisions? (5 years old or older) N   Do you have serious difficulty walking or climbing stairs? N   Do you have serious difficulty dressing or bathing? N   Because of a physical, mental, or emotional condition, do you have serious difficulty doing errands alone such as visiting the doctor? N

## 2024-04-26 NOTE — PROGRESS NOTES
04/26/24 1504   Discharge Planning   Living Arrangements Friends  ([place] with 8 other people)   Support Systems Family members;Friends/neighbors   Assistance Needed independent   Type of Residence Private residence   Home or Post Acute Services None   Does the patient need discharge transport arranged? No   Financial Resource Strain   How hard is it for you to pay for the very basics like food, housing, medical care, and heating? Pt Unable   Housing Stability   In the last 12 months, was there a time when you were not able to pay the mortgage or rent on time? Pt Unable   In the last 12 months, was there a time when you did not have a steady place to sleep or slept in a shelter (including now)? Pt Unable   Transportation Needs   In the past 12 months, has lack of transportation kept you from medical appointments or from getting medications? Pt Unable   In the past 12 months, has lack of transportation kept you from meetings, work, or from getting things needed for daily living? Pt Unable   Patient Choice   Patient / Family choosing to utilize agency / facility established prior to hospitalization Ethel Hough is a 58 y.o. male presenting with Shortness of Breath.  Per MD notes: He stated he felt like something was stuck in his throat.  This has been ongoing for over 1 week.  It seems to be worse when the patient lays down at night or when he is active.  Patient has been wheezing as well.  He is otherwise healthy.  He does not smoke.  The blood chemistry was noted for a glucose of 107.  Otherwise no electrolyte aberrancies.  Patient's proBNP was 1059.  PA and lateral chest x-ray was noted for cardiomegaly.  Mild vascular congestion and interstitial edema.  Patient lives to 8 roommates, rented home. Independent with care. Has no needs upon discharge

## 2024-04-27 ENCOUNTER — PREP FOR PROCEDURE (OUTPATIENT)
Dept: CARDIOLOGY | Facility: HOSPITAL | Age: 59
End: 2024-04-27

## 2024-04-27 DIAGNOSIS — I21.4 NSTEMI (NON-ST ELEVATED MYOCARDIAL INFARCTION) (MULTI): Primary | ICD-10-CM

## 2024-04-27 LAB
GLUCOSE BLD MANUAL STRIP-MCNC: 142 MG/DL (ref 74–99)
GLUCOSE BLD MANUAL STRIP-MCNC: 144 MG/DL (ref 74–99)
GLUCOSE BLD MANUAL STRIP-MCNC: 159 MG/DL (ref 74–99)
GLUCOSE BLD MANUAL STRIP-MCNC: 163 MG/DL (ref 74–99)

## 2024-04-27 PROCEDURE — 2500000001 HC RX 250 WO HCPCS SELF ADMINISTERED DRUGS (ALT 637 FOR MEDICARE OP): Performed by: INTERNAL MEDICINE

## 2024-04-27 PROCEDURE — 99232 SBSQ HOSP IP/OBS MODERATE 35: CPT | Performed by: INTERNAL MEDICINE

## 2024-04-27 PROCEDURE — 1200000002 HC GENERAL ROOM WITH TELEMETRY DAILY

## 2024-04-27 PROCEDURE — 2500000002 HC RX 250 W HCPCS SELF ADMINISTERED DRUGS (ALT 637 FOR MEDICARE OP, ALT 636 FOR OP/ED): Performed by: INTERNAL MEDICINE

## 2024-04-27 PROCEDURE — 82947 ASSAY GLUCOSE BLOOD QUANT: CPT

## 2024-04-27 PROCEDURE — 2500000004 HC RX 250 GENERAL PHARMACY W/ HCPCS (ALT 636 FOR OP/ED): Performed by: INTERNAL MEDICINE

## 2024-04-27 RX ADMIN — INSULIN LISPRO 2 UNITS: 100 INJECTION, SOLUTION INTRAVENOUS; SUBCUTANEOUS at 09:24

## 2024-04-27 RX ADMIN — LOSARTAN POTASSIUM 50 MG: 50 TABLET, FILM COATED ORAL at 09:24

## 2024-04-27 RX ADMIN — CARVEDILOL 3.12 MG: 3.12 TABLET, FILM COATED ORAL at 09:24

## 2024-04-27 RX ADMIN — ENOXAPARIN SODIUM 40 MG: 40 INJECTION SUBCUTANEOUS at 09:24

## 2024-04-27 RX ADMIN — INSULIN LISPRO 2 UNITS: 100 INJECTION, SOLUTION INTRAVENOUS; SUBCUTANEOUS at 17:21

## 2024-04-27 RX ADMIN — FAMOTIDINE 20 MG: 20 TABLET ORAL at 09:24

## 2024-04-27 RX ADMIN — CARVEDILOL 3.12 MG: 3.12 TABLET, FILM COATED ORAL at 17:21

## 2024-04-27 RX ADMIN — AMLODIPINE BESYLATE 5 MG: 5 TABLET ORAL at 09:24

## 2024-04-27 RX ADMIN — ASPIRIN 81 MG: 81 TABLET, COATED ORAL at 09:24

## 2024-04-27 RX ADMIN — ATORVASTATIN CALCIUM 40 MG: 40 TABLET, FILM COATED ORAL at 20:24

## 2024-04-27 ASSESSMENT — COGNITIVE AND FUNCTIONAL STATUS - GENERAL
MOBILITY SCORE: 24
MOBILITY SCORE: 24
DAILY ACTIVITIY SCORE: 24
DAILY ACTIVITIY SCORE: 24

## 2024-04-27 ASSESSMENT — PAIN SCALES - GENERAL
PAINLEVEL_OUTOF10: 0 - NO PAIN

## 2024-04-27 NOTE — PROGRESS NOTES
Pratima Napier is a 58 y.o. male on day 1 of admission presenting with CHF (congestive heart failure), NYHA class I, acute on chronic, combined (Multi).      Subjective   Patient with no significant complaints today, nurse at bedside to interpret.       Objective     Last Recorded Vitals  BP (!) 145/99 (BP Location: Left arm, Patient Position: Sitting)   Pulse 76   Temp 36.7 °C (98.1 °F) (Oral)   Resp 16   Wt 81.6 kg (180 lb)   SpO2 99%   Intake/Output last 3 Shifts:  No intake or output data in the 24 hours ending 04/27/24 1141    Admission Weight  Weight: 81.6 kg (180 lb) (04/26/24 0057)    Daily Weight  04/26/24 : 81.6 kg (180 lb)    Image Results  Transthoracic Echo (TTE) Complete              [place] [id number]              Phone [phone]    TRANSTHORACIC ECHOCARDIOGRAM REPORT       Patient Name:     PRATIMA NAPIER          Reading Physician:   [id number] De La Cruz DO  Study Date:       4/26/2024            Ordering Provider:   [id number] LETTY WEST  MRN/PID:          [id number]             Fellow:  Accession#:       [id number]         Nurse:  Date of           1965 / 58 years Sonographer:         Marlena Zuñiga RDCS  Birth/Age:  Gender:           M                    Additional Staff:  Height:           172.72 cm            Admit Date:  Weight:           81.65 kg             Admission Status:    Inpatient - Routine  BSA / BMI:        1.95 m2 / 27.37      Department Location: [facility]                    kg/m2  Blood Pressure: 162 /99 mmHg    Study Type:    TRANSTHORACIC ECHO (TTE) COMPLETE  Diagnosis/ICD: Heart failure, unspecified-I50.9  Indication:    Congestive Heart Failure  CPT Codes:     Echo Complete w Full [id number]    Patient History:  Pertinent History: CHF and HTN.    Study Detail: The following Echo studies were performed: 2D, M-Mode, Doppler and                color flow.        PHYSICIAN INTERPRETATION:  Left Ventricle: Left ventricular systolic function is severely decreased, with an estimated ejection fraction of 25-30%. Wall motion is abnormal. The left ventricular cavity size is moderately dilated. Spectral Doppler shows a pseudonormal pattern of left ventricular diastolic filling. Severe anterior, anteroseptal, and apical hypokinesis.  Left Atrium: The left atrium is normal in size.  Right Ventricle: The right ventricle is normal in size. There is normal right ventricular global systolic function.  Right Atrium: The right atrium is normal in size.  Aortic Valve: The aortic valve is trileaflet. There is no evidence of aortic valve regurgitation. The peak instantaneous gradient of the aortic valve is 9.7 mmHg. The mean gradient of the aortic valve is 5.0 mmHg.  Mitral Valve: The mitral valve is normal in structure. There is mild mitral valve regurgitation.  Tricuspid Valve: The tricuspid valve is structurally normal. There is mild tricuspid regurgitation.  Pulmonic Valve: The pulmonic valve is not well visualized. The pulmonic valve regurgitation was not well visualized.  Pericardium: There is no pericardial effusion noted.  Aorta: The aortic root is normal.       CONCLUSIONS:   1. Left ventricular systolic function is severely decreased with a 25-30% estimated ejection fraction.   2. Severe anterior, anteroseptal, and apical hypokinesis.   3. Spectral Doppler shows a pseudonormal pattern of left ventricular diastolic filling.   4. Left ventricular cavity size is moderately dilated.    QUANTITATIVE DATA SUMMARY:  2D MEASUREMENTS:                            Normal Ranges:  LAs:           3.70 cm    (2.7-4.0cm)  IVSd:          1.08 cm    (0.6-1.1cm)  LVPWd:         1.24 cm    (0.6-1.1cm)  LVIDd:         5.86 cm    (3.9-5.9cm)  LVIDs:         4.72 cm  LV Mass Index: 147.6 g/m2  LV % FS        19.5 %    LA VOLUME:                                Normal Ranges:  LA Vol A4C:        36.8 ml     (22+/-6mL/m2)  LA Vol A2C:        80.4 ml  LA Vol BP:         57.1 ml  LA Vol Index A4C:  18.8ml/m2  LA Vol Index A2C:  41.1 ml/m2  LA Vol Index BP:   29.2 ml/m2  LA Area A4C:       15.6 cm2  LA Area A2C:       24.2 cm2  LA Major Axis A4C: 5.6 cm  LA Major Axis A2C: 6.2 cm  LA Volume Index:   27.8 ml/m2  LA Vol A4C:        35.5 ml  LA Vol A2C:        75.7 ml    RA VOLUME BY A/L METHOD:                                Normal Ranges:  RA Vol A4C:        64.5 ml    (8.3-19.5ml)  RA Vol Index A4C:  33.0 ml/m2  RA Area A4C:       20.9 cm2  RA Major Axis A4C: 5.8 cm    AORTA MEASUREMENTS:                     Normal Ranges:  Asc Ao, d: 3.20 cm (2.1-3.4cm)    LV SYSTOLIC FUNCTION BY 2D PLANIMETRY (MOD):                      Normal Ranges:  EF-A4C View: 25.7 % (>=55%)  EF-A2C View: 25.2 %  EF-Biplane:  25.9 %    LV DIASTOLIC FUNCTION:                         Normal Ranges:  MV Peak E:    1.36 m/s (0.7-1.2 m/s)  MV Peak A:    0.54 m/s (0.42-0.7 m/s)  E/A Ratio:    2.54     (1.0-2.2)  MV e'         0.06 m/s (>8.0)  MV lateral e' 0.08 m/s  MV medial e'  0.04 m/s  E/e' Ratio:   22.67    (<8.0)    MITRAL VALVE:                  Normal Ranges:  MV DT: 127 msec (150-240msec)    MITRAL INSUFFICIENCY:                            Normal Ranges:  PISA Radius:  0.3 cm  MR VTI:       157.00 cm  MR Vmax:      478.00 cm/s  MR Alias Raheel: 38.4 cm/s  MR Volume:    7.13 ml  MR Flow Rt:   21.71 ml/s  MR EROA:      0.05 cm2    AORTIC VALVE:                                    Normal Ranges:  AoV Vmax:                1.56 m/s (<=1.7m/s)  AoV Peak P.7 mmHg (<20mmHg)  AoV Mean P.0 mmHg (1.7-11.5mmHg)  LVOT Max Raheel:            1.12 m/s (<=1.1m/s)  AoV VTI:                 24.30 cm (18-25cm)  LVOT VTI:                17.40 cm  LVOT Diameter:           2.00 cm  (1.8-2.4cm)  AoV Area, VTI:           2.25 cm2 (2.5-5.5cm2)  AoV Area,Vmax:           2.26 cm2 (2.5-4.5cm2)  AoV Dimensionless Index: 0.72       RIGHT  VENTRICLE:  RV Basal 4.17 cm  RV Mid   2.08 cm  RV Major 8.3 cm  TAPSE:   24.2 mm  RV s'    0.13 m/s    TRICUSPID VALVE/RVSP:                              Normal Ranges:  Peak TR Velocity: 3.80 m/s  RV Syst Pressure: 60.8 mmHg (< 30mmHg)  IVC Diam:         1.77 cm    PULMONIC VALVE:                          Normal Ranges:  PV Accel Time: 90 msec  (>120ms)  PV Max Raheel:    1.3 m/s  (0.6-0.9m/s)  PV Max P.5 mmHg       [id number] Kyle De La Cruz   Electronically signed on 2024 at 9:02:45 AM       ** Final **  XR chest 2 views  Narrative: Interpreted By:  Krystyna Mccrary,   STUDY:  XR CHEST 2 VIEWS;  2024 3:20 am      INDICATION:  Signs/Symptoms:cough, sob.      COMPARISON:  None.      ACCESSION NUMBER(S):  [id number]      ORDERING CLINICIAN:  DENI RIZO      TECHNIQUE:  Frontal and lateral views of the chest were obtained.      FINDINGS:  The heart is enlarged. There is mild vascular congestion and  interstitial edema.      No focal consolidation, pleural effusion or pneumothorax.      Multilevel degenerative changes are noted at the spine.      Impression: 1. Cardiomegaly. Mild vascular congestion and interstitial edema.              MACRO:  None.      Signed by: Krystyna Mccrary 2024 3:37 AM  Dictation workstation:   [id number]      Physical Exam  Generally no acute distress  HEENT PERRL EOMI  Cardiovascular S1-S2 heard regular rate and rhythm no murmurs rubs or gallops  Lungs clear to auscultation bilaterally  Abdomen bowel sounds present  Extremities no clubbing cyanosis edema    Relevant Results  Scheduled medications  amLODIPine, 5 mg, oral, Daily  aspirin, 81 mg, oral, Daily  atorvastatin, 40 mg, oral, Nightly  carvedilol, 3.125 mg, oral, BID with meals  enoxaparin, 40 mg, subcutaneous, Daily  famotidine, 20 mg, oral, Daily  insulin lispro, 0-10 Units, subcutaneous, TID with meals  losartan, 50 mg, oral, Daily  perflutren lipid microspheres, 0.5-10 mL of dilution, intravenous, Once in  imaging  perflutren protein A microsphere, 0.5 mL, intravenous, Once in imaging  sulfur hexafluoride microsphr, 2 mL, intravenous, Once in imaging      Continuous medications     PRN medications  PRN medications: acetaminophen **OR** acetaminophen **OR** acetaminophen, benzocaine-menthol, dextromethorphan-guaifenesin, dextrose, dextrose, glucagon, glucagon, guaiFENesin, hydrALAZINE, ondansetron ODT **OR** ondansetron, polyethylene glycol  Results for orders placed or performed during the hospital encounter of 04/26/24 (from the past 24 hour(s))   Serial Troponin, 6 Hour ([facility])   Result Value Ref Range    Troponin T, High Sensitivity 15 (HH) <=14 ng/L   POCT GLUCOSE   Result Value Ref Range    POCT Glucose 172 (H) 74 - 99 mg/dL   POCT GLUCOSE   Result Value Ref Range    POCT Glucose 141 (H) 74 - 99 mg/dL   POCT GLUCOSE   Result Value Ref Range    POCT Glucose 97 74 - 99 mg/dL   POCT GLUCOSE   Result Value Ref Range    POCT Glucose 159 (H) 74 - 99 mg/dL     Impression: 58-year-old Syrian-speaking gentleman admitted with shortness of breath and found to have new systolic heart failure.    Plan:    1.  Systolic heart failure  -Appreciate cardiology input, plan for cardiac catheterization on Monday to determine ischemic versus nonischemic  -Continue with medical management per cardiology    2.  Hyperglycemia  -Check hemoglobin A1c  -Every shift blood sugars    3.  Social  -Spoke to  yesterday, patient is a legal resident and here to work over the summer, had Medicaid in [place] will need to resubmit paperwork if needed for [place] Medicaid.  Social work to help initiate process.               Assessment/Plan                  Principal Problem:    CHF (congestive heart failure), NYHA class I, acute on chronic, combined (Multi)  Active Problems:    Orthopnea    HTN (hypertension)    Heart failure (Multi)                  Zach Solomon MD

## 2024-04-27 NOTE — PROGRESS NOTES
"Tomas Hough is a 58 y.o. male on day 1 of admission presenting with CHF (congestive heart failure), NYHA class I, acute on chronic, combined (Multi).    Subjective   No complaints today       Objective     Physical Exam   Gen: NAD   Neck: no JVD, carotid upstroke is brisk and without delay   Heart: rrr, s1s2+ no mrg   Lungs: CTA   Ext: warm no edema    Last Recorded Vitals  Blood pressure (!) 145/99, pulse 76, temperature 36.7 °C (98.1 °F), temperature source Oral, resp. rate 16, height 1.727 m (5' 8\"), weight 81.6 kg (180 lb), SpO2 99%.  Intake/Output last 3 Shifts:  No intake/output data recorded.                 Assessment/Plan   Principal Problem:    CHF (congestive heart failure), NYHA class I, acute on chronic, combined (Multi)  Active Problems:    Orthopnea    HTN (hypertension)    Heart failure (Multi)    Late presenting anterior wall myocardial infarction  Acute systolic heart failure  Diabetes mellitus type 2  Myocardial injury     4/26: His echocardiogram shows a dilated cardiomyopathy.  There is moderate global hypokinesis with severe anterior, anteroseptal, and apical hypokinesis.  Ejection fraction is approximately 25 to 30%.  These findings are consistent with a late presenting anterior wall myocardial infarction with subsequent acute systolic heart failure.  Will start him on carvedilol and losartan.  Would like to proceed with coronary angiography as early as Monday.  Will continue to hold his metformin.  Will start atorvastatin and aspirin.  As for his volume status he did receive 1 dose of IV Lasix with unclear diuretic response.  I feel that he is euvolemic on exam today I would not diurese further.  Will continue to follow this patient with you.    4/27: Had a long conversation with him this morning about the inpatient plan from a cardiac standpoint using the virtual .  Details of cardiac catheterization as well as the risks benefits and alternatives were explained to him.  Will plan " on cardiac catheterization Monday morning.       I spent 35 minutes in the professional and overall care of this patient.      Kyle De La Cruz, DO

## 2024-04-27 NOTE — CARE PLAN
Plan is Cardiac Cath Monday morning 4/29/24    DISCHARGE PLAN: AT THIS TIME THE PLAN IS HOME WITH ROOM MATES

## 2024-04-27 NOTE — CARE PLAN
The patient's goals for the shift include  feel better    The clinical goals for the shift include safety      Problem: Pain  Goal: My pain/discomfort is manageable  Outcome: Progressing     Problem: Safety  Goal: Patient will be injury free during hospitalization  Outcome: Progressing  Goal: I will remain free of falls  Outcome: Progressing     Problem: Daily Care  Goal: Daily care needs are met  Outcome: Progressing     Problem: Psychosocial Needs  Goal: Demonstrates ability to cope with hospitalization/illness  Outcome: Progressing  Goal: Collaborate with me, my family, and caregiver to identify my specific goals  Outcome: Progressing     Problem: Discharge Barriers  Goal: My discharge needs are met  Outcome: Progressing

## 2024-04-28 LAB
GLUCOSE BLD MANUAL STRIP-MCNC: 103 MG/DL (ref 74–99)
GLUCOSE BLD MANUAL STRIP-MCNC: 116 MG/DL (ref 74–99)
GLUCOSE BLD MANUAL STRIP-MCNC: 135 MG/DL (ref 74–99)
GLUCOSE BLD MANUAL STRIP-MCNC: 152 MG/DL (ref 74–99)

## 2024-04-28 PROCEDURE — 2500000004 HC RX 250 GENERAL PHARMACY W/ HCPCS (ALT 636 FOR OP/ED): Performed by: INTERNAL MEDICINE

## 2024-04-28 PROCEDURE — 2500000001 HC RX 250 WO HCPCS SELF ADMINISTERED DRUGS (ALT 637 FOR MEDICARE OP): Performed by: INTERNAL MEDICINE

## 2024-04-28 PROCEDURE — 99232 SBSQ HOSP IP/OBS MODERATE 35: CPT | Performed by: INTERNAL MEDICINE

## 2024-04-28 PROCEDURE — 82947 ASSAY GLUCOSE BLOOD QUANT: CPT

## 2024-04-28 PROCEDURE — 1200000002 HC GENERAL ROOM WITH TELEMETRY DAILY

## 2024-04-28 PROCEDURE — 2500000002 HC RX 250 W HCPCS SELF ADMINISTERED DRUGS (ALT 637 FOR MEDICARE OP, ALT 636 FOR OP/ED): Performed by: INTERNAL MEDICINE

## 2024-04-28 RX ADMIN — LOSARTAN POTASSIUM 50 MG: 50 TABLET, FILM COATED ORAL at 08:34

## 2024-04-28 RX ADMIN — FAMOTIDINE 20 MG: 20 TABLET ORAL at 08:33

## 2024-04-28 RX ADMIN — ASPIRIN 81 MG: 81 TABLET, COATED ORAL at 08:34

## 2024-04-28 RX ADMIN — AMLODIPINE BESYLATE 5 MG: 5 TABLET ORAL at 08:34

## 2024-04-28 RX ADMIN — ENOXAPARIN SODIUM 40 MG: 40 INJECTION SUBCUTANEOUS at 08:33

## 2024-04-28 RX ADMIN — CARVEDILOL 3.12 MG: 3.12 TABLET, FILM COATED ORAL at 08:34

## 2024-04-28 RX ADMIN — INSULIN LISPRO 2 UNITS: 100 INJECTION, SOLUTION INTRAVENOUS; SUBCUTANEOUS at 12:15

## 2024-04-28 RX ADMIN — CARVEDILOL 3.12 MG: 3.12 TABLET, FILM COATED ORAL at 17:57

## 2024-04-28 RX ADMIN — ATORVASTATIN CALCIUM 40 MG: 40 TABLET, FILM COATED ORAL at 21:01

## 2024-04-28 ASSESSMENT — COGNITIVE AND FUNCTIONAL STATUS - GENERAL
MOBILITY SCORE: 24
DAILY ACTIVITIY SCORE: 24

## 2024-04-28 ASSESSMENT — PAIN SCALES - GENERAL
PAINLEVEL_OUTOF10: 0 - NO PAIN

## 2024-04-28 ASSESSMENT — PAIN - FUNCTIONAL ASSESSMENT: PAIN_FUNCTIONAL_ASSESSMENT: 0-10

## 2024-04-28 NOTE — PROGRESS NOTES
Tomas Napier is a 58 y.o. male on day 2 of admission presenting with CHF (congestive heart failure), NYHA class I, acute on chronic, combined (Multi).      Subjective   Patient with no significant complaints today, Turkish speaking nurse at bedside to interpret again.       Objective     Last Recorded Vitals  /67 (BP Location: Left arm, Patient Position: Sitting)   Pulse 75   Temp 36.7 °C (98.1 °F) (Oral)   Resp 19   Wt 81.6 kg (180 lb)   SpO2 98%   Intake/Output last 3 Shifts:    Intake/Output Summary (Last 24 hours) at 4/28/2024 1716  Last data filed at 4/28/2024 1400  Gross per 24 hour   Intake 720 ml   Output 650 ml   Net 70 ml       Admission Weight  Weight: 81.6 kg (180 lb) (04/26/24 0057)    Daily Weight  04/26/24 : 81.6 kg (180 lb)    Image Results  Transthoracic Echo (TTE) Complete              [place] [id number]              Phone [phone]    TRANSTHORACIC ECHOCARDIOGRAM REPORT       Patient Name:     TOMAS NAPIER          Reading Physician:   [id number] De La Cruz DO  Study Date:       4/26/2024            Ordering Provider:   [id number] WEST  MRN/PID:          [id number]             Fellow:  Accession#:       [id number]         Nurse:  Date of           1965 / 58 years Sonographer:         Marlena Zuñiga RDCS  Birth/Age:  Gender:           M                    Additional Staff:  Height:           172.72 cm            Admit Date:  Weight:           81.65 kg             Admission Status:    Inpatient - Routine  BSA / BMI:        1.95 m2 / 27.37      Department Location: [facility]                    kg/m2  Blood Pressure: 162 /99 mmHg    Study Type:    TRANSTHORACIC ECHO (TTE) COMPLETE  Diagnosis/ICD: Heart failure, unspecified-I50.9  Indication:    Congestive Heart Failure  CPT Codes:     Echo Complete w Full Doppler-[id number]    Patient History:  Pertinent History: CHF and  HTN.    Study Detail: The following Echo studies were performed: 2D, M-Mode, Doppler and                color flow.       PHYSICIAN INTERPRETATION:  Left Ventricle: Left ventricular systolic function is severely decreased, with an estimated ejection fraction of 25-30%. Wall motion is abnormal. The left ventricular cavity size is moderately dilated. Spectral Doppler shows a pseudonormal pattern of left ventricular diastolic filling. Severe anterior, anteroseptal, and apical hypokinesis.  Left Atrium: The left atrium is normal in size.  Right Ventricle: The right ventricle is normal in size. There is normal right ventricular global systolic function.  Right Atrium: The right atrium is normal in size.  Aortic Valve: The aortic valve is trileaflet. There is no evidence of aortic valve regurgitation. The peak instantaneous gradient of the aortic valve is 9.7 mmHg. The mean gradient of the aortic valve is 5.0 mmHg.  Mitral Valve: The mitral valve is normal in structure. There is mild mitral valve regurgitation.  Tricuspid Valve: The tricuspid valve is structurally normal. There is mild tricuspid regurgitation.  Pulmonic Valve: The pulmonic valve is not well visualized. The pulmonic valve regurgitation was not well visualized.  Pericardium: There is no pericardial effusion noted.  Aorta: The aortic root is normal.       CONCLUSIONS:   1. Left ventricular systolic function is severely decreased with a 25-30% estimated ejection fraction.   2. Severe anterior, anteroseptal, and apical hypokinesis.   3. Spectral Doppler shows a pseudonormal pattern of left ventricular diastolic filling.   4. Left ventricular cavity size is moderately dilated.    QUANTITATIVE DATA SUMMARY:  2D MEASUREMENTS:                            Normal Ranges:  LAs:           3.70 cm    (2.7-4.0cm)  IVSd:          1.08 cm    (0.6-1.1cm)  LVPWd:         1.24 cm    (0.6-1.1cm)  LVIDd:         5.86 cm    (3.9-5.9cm)  LVIDs:         4.72 cm  LV Mass Index:  147.6 g/m2  LV % FS        19.5 %    LA VOLUME:                                Normal Ranges:  LA Vol A4C:        36.8 ml    (22+/-6mL/m2)  LA Vol A2C:        80.4 ml  LA Vol BP:         57.1 ml  LA Vol Index A4C:  18.8ml/m2  LA Vol Index A2C:  41.1 ml/m2  LA Vol Index BP:   29.2 ml/m2  LA Area A4C:       15.6 cm2  LA Area A2C:       24.2 cm2  LA Major Axis A4C: 5.6 cm  LA Major Axis A2C: 6.2 cm  LA Volume Index:   27.8 ml/m2  LA Vol A4C:        35.5 ml  LA Vol A2C:        75.7 ml    RA VOLUME BY A/L METHOD:                                Normal Ranges:  RA Vol A4C:        64.5 ml    (8.3-19.5ml)  RA Vol Index A4C:  33.0 ml/m2  RA Area A4C:       20.9 cm2  RA Major Axis A4C: 5.8 cm    AORTA MEASUREMENTS:                     Normal Ranges:  Asc Ao, d: 3.20 cm (2.1-3.4cm)    LV SYSTOLIC FUNCTION BY 2D PLANIMETRY (MOD):                      Normal Ranges:  EF-A4C View: 25.7 % (>=55%)  EF-A2C View: 25.2 %  EF-Biplane:  25.9 %    LV DIASTOLIC FUNCTION:                         Normal Ranges:  MV Peak E:    1.36 m/s (0.7-1.2 m/s)  MV Peak A:    0.54 m/s (0.42-0.7 m/s)  E/A Ratio:    2.54     (1.0-2.2)  MV e'         0.06 m/s (>8.0)  MV lateral e' 0.08 m/s  MV medial e'  0.04 m/s  E/e' Ratio:   22.67    (<8.0)    MITRAL VALVE:                  Normal Ranges:  MV DT: 127 msec (150-240msec)    MITRAL INSUFFICIENCY:                            Normal Ranges:  PISA Radius:  0.3 cm  MR VTI:       157.00 cm  MR Vmax:      478.00 cm/s  MR Alias Raheel: 38.4 cm/s  MR Volume:    7.13 ml  MR Flow Rt:   21.71 ml/s  MR EROA:      0.05 cm2    AORTIC VALVE:                                    Normal Ranges:  AoV Vmax:                1.56 m/s (<=1.7m/s)  AoV Peak P.7 mmHg (<20mmHg)  AoV Mean P.0 mmHg (1.7-11.5mmHg)  LVOT Max Raheel:            1.12 m/s (<=1.1m/s)  AoV VTI:                 24.30 cm (18-25cm)  LVOT VTI:                17.40 cm  LVOT Diameter:           2.00 cm  (1.8-2.4cm)  AoV Area, VTI:            2.25 cm2 (2.5-5.5cm2)  AoV Area,Vmax:           2.26 cm2 (2.5-4.5cm2)  AoV Dimensionless Index: 0.72       RIGHT VENTRICLE:  RV Basal 4.17 cm  RV Mid   2.08 cm  RV Major 8.3 cm  TAPSE:   24.2 mm  RV s'    0.13 m/s    TRICUSPID VALVE/RVSP:                              Normal Ranges:  Peak TR Velocity: 3.80 m/s  RV Syst Pressure: 60.8 mmHg (< 30mmHg)  IVC Diam:         1.77 cm    PULMONIC VALVE:                          Normal Ranges:  PV Accel Time: 90 msec  (>120ms)  PV Max Raheel:    1.3 m/s  (0.6-0.9m/s)  PV Max P.5 mmHg       [id number] Kyle De La Cruz DO  Electronically signed on 2024 at 9:02:45 AM       ** Final **  XR chest 2 views  Narrative: Interpreted By:  Krystyna Mccrary,   STUDY:  XR CHEST 2 VIEWS;  2024 3:20 am      INDICATION:  Signs/Symptoms:cough, sob.      COMPARISON:  None.      ACCESSION NUMBER(S):  [id number]      ORDERING CLINICIAN:  DENI RIZO      TECHNIQUE:  Frontal and lateral views of the chest were obtained.      FINDINGS:  The heart is enlarged. There is mild vascular congestion and  interstitial edema.      No focal consolidation, pleural effusion or pneumothorax.      Multilevel degenerative changes are noted at the spine.      Impression: 1. Cardiomegaly. Mild vascular congestion and interstitial edema.              MACRO:  None.      Signed by: Krystyna Mccrary 2024 3:37 AM  Dictation workstation:   [id number]      Physical Exam  Generally no acute distress  HEENT PERRL EOMI  Cardiovascular S1-S2 heard regular rate and rhythm no murmurs rubs or gallops  Lungs clear to auscultation bilaterally  Abdomen bowel sounds present  Extremities no clubbing cyanosis edema    Relevant Results  Scheduled medications  amLODIPine, 5 mg, oral, Daily  aspirin, 81 mg, oral, Daily  atorvastatin, 40 mg, oral, Nightly  carvedilol, 3.125 mg, oral, BID with meals  enoxaparin, 40 mg, subcutaneous, Daily  famotidine, 20 mg, oral, Daily  insulin lispro, 0-10 Units, subcutaneous, TID with  meals  losartan, 50 mg, oral, Daily  perflutren lipid microspheres, 0.5-10 mL of dilution, intravenous, Once in imaging  perflutren protein A microsphere, 0.5 mL, intravenous, Once in imaging  sulfur hexafluoride microsphr, 2 mL, intravenous, Once in imaging      Continuous medications     PRN medications  PRN medications: acetaminophen **OR** acetaminophen **OR** acetaminophen, benzocaine-menthol, dextromethorphan-guaifenesin, dextrose, dextrose, glucagon, glucagon, guaiFENesin, hydrALAZINE, ondansetron ODT **OR** ondansetron, polyethylene glycol  Results for orders placed or performed during the hospital encounter of 04/26/24 (from the past 24 hour(s))   POCT GLUCOSE   Result Value Ref Range    POCT Glucose 144 (H) 74 - 99 mg/dL   POCT GLUCOSE   Result Value Ref Range    POCT Glucose 135 (H) 74 - 99 mg/dL   POCT GLUCOSE   Result Value Ref Range    POCT Glucose 152 (H) 74 - 99 mg/dL   POCT GLUCOSE   Result Value Ref Range    POCT Glucose 116 (H) 74 - 99 mg/dL     Impression: 58-year-old Botswanan-speaking gentleman admitted with shortness of breath and found to have new systolic heart failure.    Plan:    1.  Systolic heart failure  -Appreciate cardiology input, plan for cardiac catheterization tomorrow to determine ischemic versus nonischemic  -Continue with medical management per cardiology    2.  Hyperglycemia  -A1C borderline, discussed lifestyle modification  -Every shift blood sugars    3.  Social  -Spoke to  Friday,  patient is a legal resident and here to work over the summer, had Medicaid in [place] will need to resubmit paperwork if needed for [place] Medicaid.  Social work to help initiate process.               Assessment/Plan                  Principal Problem:    CHF (congestive heart failure), NYHA class I, acute on chronic, combined (Multi)  Active Problems:    Orthopnea    HTN (hypertension)    Heart failure (Multi)                  Zach Solomon MD

## 2024-04-28 NOTE — CARE PLAN
The patient's goals for the shift include  await heart cath    The clinical goals for the shift include safety      Problem: Pain  Goal: My pain/discomfort is manageable  4/28/2024 1029 by Martine Ocampo RN  Outcome: Progressing  4/28/2024 1029 by Martine Ocampo RN  Outcome: Progressing     Problem: Safety  Goal: Patient will be injury free during hospitalization  4/28/2024 1029 by Martine Ocampo RN  Outcome: Progressing  4/28/2024 1029 by Martine Ocampo RN  Outcome: Progressing  Goal: I will remain free of falls  4/28/2024 1029 by Martine Ocampo RN  Outcome: Progressing  4/28/2024 1029 by Martine Ocampo RN  Outcome: Progressing     Problem: Daily Care  Goal: Daily care needs are met  Outcome: Progressing     Problem: Psychosocial Needs  Goal: Demonstrates ability to cope with hospitalization/illness  Outcome: Progressing  Goal: Collaborate with me, my family, and caregiver to identify my specific goals  Outcome: Progressing

## 2024-04-28 NOTE — CARE PLAN
The patient's goals for the shift include  to feel better    The clinical goals for the shift include safety    Over the shift, the patient did make progress toward the following goals.       Problem: Safety  Goal: Patient will be injury free during hospitalization  Outcome: Progressing  Goal: I will remain free of falls  Outcome: Progressing     Problem: Daily Care  Goal: Daily care needs are met  Outcome: Progressing     Problem: Psychosocial Needs  Goal: Demonstrates ability to cope with hospitalization/illness  Outcome: Progressing

## 2024-04-28 NOTE — PROGRESS NOTES
"Tomas Hough is a 58 y.o. male on day 2 of admission presenting with CHF (congestive heart failure), NYHA class I, acute on chronic, combined (Multi).    Subjective   No complaints       Objective     Physical Exam   Gen: NAD   Neck: no JVD, carotid upstroke is brisk and without delay   Heart: rrr, s1s2+ no mrg   Lungs: CTA   Ext: warm no edema    Last Recorded Vitals  Blood pressure 140/89, pulse 65, temperature 36.7 °C (98.1 °F), temperature source Oral, resp. rate 17, height 1.727 m (5' 8\"), weight 81.6 kg (180 lb), SpO2 98%.  Intake/Output last 3 Shifts:  I/O last 3 completed shifts:  In: 640 (7.8 mL/kg) [P.O.:640]  Out: 650 (8 mL/kg) [Urine:650 (0.2 mL/kg/hr)]  Weight: 81.6 kg       Assessment/Plan   Principal Problem:    CHF (congestive heart failure), NYHA class I, acute on chronic, combined (Multi)  Active Problems:    Orthopnea    HTN (hypertension)    Heart failure (Multi)    Late presenting anterior wall myocardial infarction  Acute systolic heart failure  Diabetes mellitus type 2  Myocardial injury     4/26: His echocardiogram shows a dilated cardiomyopathy.  There is moderate global hypokinesis with severe anterior, anteroseptal, and apical hypokinesis.  Ejection fraction is approximately 25 to 30%.  These findings are consistent with a late presenting anterior wall myocardial infarction with subsequent acute systolic heart failure.  Will start him on carvedilol and losartan.  Would like to proceed with coronary angiography as early as Monday.  Will continue to hold his metformin.  Will start atorvastatin and aspirin.  As for his volume status he did receive 1 dose of IV Lasix with unclear diuretic response.  I feel that he is euvolemic on exam today I would not diurese further.  Will continue to follow this patient with you.     4/27: Had a long conversation with him this morning about the inpatient plan from a cardiac standpoint using the virtual .  Details of cardiac catheterization as well " as the risks benefits and alternatives were explained to him.  Will plan on cardiac catheterization Monday morning.    4/28: Will plan on coronary angiography tomorrow with possible PCI. Continue current medications       I spent 25 minutes in the professional and overall care of this patient.      Kyle De La Cruz, DO

## 2024-04-29 ENCOUNTER — HOSPITAL ENCOUNTER (OUTPATIENT)
Facility: HOSPITAL | Age: 59
Setting detail: OUTPATIENT SURGERY
Discharge: STILL ADMITTED OR ADMINISTRATIVE | End: 2024-04-29
Attending: INTERNAL MEDICINE | Admitting: INTERNAL MEDICINE

## 2024-04-29 ENCOUNTER — PHARMACY VISIT (OUTPATIENT)
Dept: PHARMACY | Facility: CLINIC | Age: 59
End: 2024-04-29

## 2024-04-29 VITALS
TEMPERATURE: 98.1 F | DIASTOLIC BLOOD PRESSURE: 94 MMHG | RESPIRATION RATE: 18 BRPM | HEART RATE: 73 BPM | SYSTOLIC BLOOD PRESSURE: 145 MMHG | BODY MASS INDEX: 27.28 KG/M2 | HEIGHT: 68 IN | WEIGHT: 180 LBS | OXYGEN SATURATION: 97 %

## 2024-04-29 VITALS
RESPIRATION RATE: 18 BRPM | DIASTOLIC BLOOD PRESSURE: 93 MMHG | HEART RATE: 76 BPM | OXYGEN SATURATION: 94 % | SYSTOLIC BLOOD PRESSURE: 152 MMHG

## 2024-04-29 DIAGNOSIS — I21.4 NSTEMI (NON-ST ELEVATED MYOCARDIAL INFARCTION) (MULTI): ICD-10-CM

## 2024-04-29 LAB
ANION GAP SERPL CALC-SCNC: 11 MMOL/L
BUN SERPL-MCNC: 24 MG/DL (ref 8–25)
CALCIUM SERPL-MCNC: 8.9 MG/DL (ref 8.5–10.4)
CHLORIDE SERPL-SCNC: 103 MMOL/L (ref 97–107)
CO2 SERPL-SCNC: 23 MMOL/L (ref 24–31)
CREAT SERPL-MCNC: 1.3 MG/DL (ref 0.4–1.6)
EGFRCR SERPLBLD CKD-EPI 2021: 64 ML/MIN/1.73M*2
GLUCOSE BLD MANUAL STRIP-MCNC: 137 MG/DL (ref 74–99)
GLUCOSE BLD MANUAL STRIP-MCNC: 159 MG/DL (ref 74–99)
GLUCOSE SERPL-MCNC: 120 MG/DL (ref 65–99)
POTASSIUM SERPL-SCNC: 3.8 MMOL/L (ref 3.4–5.1)
SODIUM SERPL-SCNC: 137 MMOL/L (ref 133–145)

## 2024-04-29 PROCEDURE — 82947 ASSAY GLUCOSE BLOOD QUANT: CPT

## 2024-04-29 PROCEDURE — RXMED WILLOW AMBULATORY MEDICATION CHARGE

## 2024-04-29 PROCEDURE — C1894 INTRO/SHEATH, NON-LASER: HCPCS | Performed by: INTERNAL MEDICINE

## 2024-04-29 PROCEDURE — 99152 MOD SED SAME PHYS/QHP 5/>YRS: CPT | Performed by: INTERNAL MEDICINE

## 2024-04-29 PROCEDURE — C1769 GUIDE WIRE: HCPCS | Performed by: INTERNAL MEDICINE

## 2024-04-29 PROCEDURE — 2550000001 HC RX 255 CONTRASTS: Performed by: INTERNAL MEDICINE

## 2024-04-29 PROCEDURE — 93454 CORONARY ARTERY ANGIO S&I: CPT | Performed by: INTERNAL MEDICINE

## 2024-04-29 PROCEDURE — 2500000005 HC RX 250 GENERAL PHARMACY W/O HCPCS: Performed by: INTERNAL MEDICINE

## 2024-04-29 PROCEDURE — 36415 COLL VENOUS BLD VENIPUNCTURE: CPT | Performed by: INTERNAL MEDICINE

## 2024-04-29 PROCEDURE — 7100000010 HC PHASE TWO TIME - EACH INCREMENTAL 1 MINUTE: Performed by: INTERNAL MEDICINE

## 2024-04-29 PROCEDURE — 2500000004 HC RX 250 GENERAL PHARMACY W/ HCPCS (ALT 636 FOR OP/ED): Performed by: INTERNAL MEDICINE

## 2024-04-29 PROCEDURE — 2720000007 HC OR 272 NO HCPCS: Performed by: INTERNAL MEDICINE

## 2024-04-29 PROCEDURE — C1760 CLOSURE DEV, VASC: HCPCS | Performed by: INTERNAL MEDICINE

## 2024-04-29 PROCEDURE — 82374 ASSAY BLOOD CARBON DIOXIDE: CPT | Performed by: INTERNAL MEDICINE

## 2024-04-29 PROCEDURE — 7100000009 HC PHASE TWO TIME - INITIAL BASE CHARGE: Performed by: INTERNAL MEDICINE

## 2024-04-29 RX ORDER — FENTANYL CITRATE 50 UG/ML
INJECTION, SOLUTION INTRAMUSCULAR; INTRAVENOUS AS NEEDED
Status: DISCONTINUED | OUTPATIENT
Start: 2024-04-29 | End: 2024-04-29 | Stop reason: HOSPADM

## 2024-04-29 RX ORDER — MIDAZOLAM HYDROCHLORIDE 1 MG/ML
INJECTION, SOLUTION INTRAMUSCULAR; INTRAVENOUS AS NEEDED
Status: DISCONTINUED | OUTPATIENT
Start: 2024-04-29 | End: 2024-04-29 | Stop reason: HOSPADM

## 2024-04-29 RX ORDER — LIDOCAINE HYDROCHLORIDE 10 MG/ML
INJECTION, SOLUTION EPIDURAL; INFILTRATION; INTRACAUDAL; PERINEURAL AS NEEDED
Status: DISCONTINUED | OUTPATIENT
Start: 2024-04-29 | End: 2024-04-29 | Stop reason: HOSPADM

## 2024-04-29 RX ORDER — AMLODIPINE BESYLATE 5 MG/1
5 TABLET ORAL DAILY
Qty: 30 TABLET | Refills: 0 | Status: SHIPPED | OUTPATIENT
Start: 2024-04-30

## 2024-04-29 RX ORDER — CARVEDILOL 3.12 MG/1
3.12 TABLET ORAL
Qty: 60 TABLET | Refills: 0 | Status: SHIPPED | OUTPATIENT
Start: 2024-04-29

## 2024-04-29 RX ORDER — FUROSEMIDE 20 MG/1
20 TABLET ORAL DAILY
Qty: 30 TABLET | Refills: 0 | Status: SHIPPED | OUTPATIENT
Start: 2024-04-29

## 2024-04-29 RX ORDER — ATORVASTATIN CALCIUM 40 MG/1
40 TABLET, FILM COATED ORAL NIGHTLY
Qty: 90 TABLET | Refills: 0 | Status: SHIPPED | OUTPATIENT
Start: 2024-04-29

## 2024-04-29 RX ORDER — HEPARIN SODIUM 1000 [USP'U]/ML
INJECTION, SOLUTION INTRAVENOUS; SUBCUTANEOUS AS NEEDED
Status: DISCONTINUED | OUTPATIENT
Start: 2024-04-29 | End: 2024-04-29 | Stop reason: HOSPADM

## 2024-04-29 ASSESSMENT — COLUMBIA-SUICIDE SEVERITY RATING SCALE - C-SSRS
6. HAVE YOU EVER DONE ANYTHING, STARTED TO DO ANYTHING, OR PREPARED TO DO ANYTHING TO END YOUR LIFE?: NO
2. HAVE YOU ACTUALLY HAD ANY THOUGHTS OF KILLING YOURSELF?: NO
1. IN THE PAST MONTH, HAVE YOU WISHED YOU WERE DEAD OR WISHED YOU COULD GO TO SLEEP AND NOT WAKE UP?: NO

## 2024-04-29 ASSESSMENT — PAIN SCALES - GENERAL
PAINLEVEL_OUTOF10: 0 - NO PAIN

## 2024-04-29 ASSESSMENT — PAIN - FUNCTIONAL ASSESSMENT
PAIN_FUNCTIONAL_ASSESSMENT: 0-10

## 2024-04-29 NOTE — Clinical Note
Pt. Informed post procedure via MARTII that the physician couldn't talk to him immediately after the procedure due to a STEMI in the ED, but that the physician would come and talk with him more later, and that he doesn't have blocked coronary arteries.

## 2024-04-29 NOTE — PROGRESS NOTES
Tomas Napier is a 58 y.o. male on day 3 of admission presenting with CHF (congestive heart failure), NYHA class I, acute on chronic, combined (Multi).      Subjective   Patient is [place] speaking, I used interpretation service at the bedside.  Denies chest pain       Objective     Last Recorded Vitals  /81 (BP Location: Left arm, Patient Position: Lying)   Pulse 74   Temp 36.7 °C (98.1 °F) (Oral)   Resp 17   Wt 81.6 kg (180 lb)   SpO2 98%   Intake/Output last 3 Shifts:    Intake/Output Summary (Last 24 hours) at 4/29/2024 0857  Last data filed at 4/29/2024 0711  Gross per 24 hour   Intake 680 ml   Output --   Net 680 ml       Admission Weight  Weight: 81.6 kg (180 lb) (04/26/24 0057)    Daily Weight  04/26/24 : 81.6 kg (180 lb)    Image Results  Transthoracic Echo (TTE) Complete              [place] [id number]              Phone [phone]    TRANSTHORACIC ECHOCARDIOGRAM REPORT       Patient Name:     TOMAS NAPIER          Reading Physician:   [id number] De La Cruz DO  Study Date:       4/26/2024            Ordering Provider:   [id number] WEST  MRN/PID:          [id number]             Fellow:  Accession#:       [id number]         Nurse:  Date of           1965 / 58 years Sonographer:         Marlena Zuñiga RDCS  Birth/Age:  Gender:           M                    Additional Staff:  Height:           172.72 cm            Admit Date:  Weight:           81.65 kg             Admission Status:    Inpatient - Routine  BSA / BMI:        1.95 m2 / 27.37      Department Location: [facility]                    kg/m2  Blood Pressure: 162 /99 mmHg    Study Type:    TRANSTHORACIC ECHO (TTE) COMPLETE  Diagnosis/ICD: Heart failure, unspecified-I50.9  Indication:    Congestive Heart Failure  CPT Codes:     Echo Complete w Full [id number]    Patient History:  Pertinent History: CHF and HTN.    Study  Detail: The following Echo studies were performed: 2D, M-Mode, Doppler and                color flow.       PHYSICIAN INTERPRETATION:  Left Ventricle: Left ventricular systolic function is severely decreased, with an estimated ejection fraction of 25-30%. Wall motion is abnormal. The left ventricular cavity size is moderately dilated. Spectral Doppler shows a pseudonormal pattern of left ventricular diastolic filling. Severe anterior, anteroseptal, and apical hypokinesis.  Left Atrium: The left atrium is normal in size.  Right Ventricle: The right ventricle is normal in size. There is normal right ventricular global systolic function.  Right Atrium: The right atrium is normal in size.  Aortic Valve: The aortic valve is trileaflet. There is no evidence of aortic valve regurgitation. The peak instantaneous gradient of the aortic valve is 9.7 mmHg. The mean gradient of the aortic valve is 5.0 mmHg.  Mitral Valve: The mitral valve is normal in structure. There is mild mitral valve regurgitation.  Tricuspid Valve: The tricuspid valve is structurally normal. There is mild tricuspid regurgitation.  Pulmonic Valve: The pulmonic valve is not well visualized. The pulmonic valve regurgitation was not well visualized.  Pericardium: There is no pericardial effusion noted.  Aorta: The aortic root is normal.       CONCLUSIONS:   1. Left ventricular systolic function is severely decreased with a 25-30% estimated ejection fraction.   2. Severe anterior, anteroseptal, and apical hypokinesis.   3. Spectral Doppler shows a pseudonormal pattern of left ventricular diastolic filling.   4. Left ventricular cavity size is moderately dilated.    QUANTITATIVE DATA SUMMARY:  2D MEASUREMENTS:                            Normal Ranges:  LAs:           3.70 cm    (2.7-4.0cm)  IVSd:          1.08 cm    (0.6-1.1cm)  LVPWd:         1.24 cm    (0.6-1.1cm)  LVIDd:         5.86 cm    (3.9-5.9cm)  LVIDs:         4.72 cm  LV Mass Index: 147.6 g/m2  LV %  FS        19.5 %    LA VOLUME:                                Normal Ranges:  LA Vol A4C:        36.8 ml    (22+/-6mL/m2)  LA Vol A2C:        80.4 ml  LA Vol BP:         57.1 ml  LA Vol Index A4C:  18.8ml/m2  LA Vol Index A2C:  41.1 ml/m2  LA Vol Index BP:   29.2 ml/m2  LA Area A4C:       15.6 cm2  LA Area A2C:       24.2 cm2  LA Major Axis A4C: 5.6 cm  LA Major Axis A2C: 6.2 cm  LA Volume Index:   27.8 ml/m2  LA Vol A4C:        35.5 ml  LA Vol A2C:        75.7 ml    RA VOLUME BY A/L METHOD:                                Normal Ranges:  RA Vol A4C:        64.5 ml    (8.3-19.5ml)  RA Vol Index A4C:  33.0 ml/m2  RA Area A4C:       20.9 cm2  RA Major Axis A4C: 5.8 cm    AORTA MEASUREMENTS:                     Normal Ranges:  Asc Ao, d: 3.20 cm (2.1-3.4cm)    LV SYSTOLIC FUNCTION BY 2D PLANIMETRY (MOD):                      Normal Ranges:  EF-A4C View: 25.7 % (>=55%)  EF-A2C View: 25.2 %  EF-Biplane:  25.9 %    LV DIASTOLIC FUNCTION:                         Normal Ranges:  MV Peak E:    1.36 m/s (0.7-1.2 m/s)  MV Peak A:    0.54 m/s (0.42-0.7 m/s)  E/A Ratio:    2.54     (1.0-2.2)  MV e'         0.06 m/s (>8.0)  MV lateral e' 0.08 m/s  MV medial e'  0.04 m/s  E/e' Ratio:   22.67    (<8.0)    MITRAL VALVE:                  Normal Ranges:  MV DT: 127 msec (150-240msec)    MITRAL INSUFFICIENCY:                            Normal Ranges:  PISA Radius:  0.3 cm  MR VTI:       157.00 cm  MR Vmax:      478.00 cm/s  MR Alias Raheel: 38.4 cm/s  MR Volume:    7.13 ml  MR Flow Rt:   21.71 ml/s  MR EROA:      0.05 cm2    AORTIC VALVE:                                    Normal Ranges:  AoV Vmax:                1.56 m/s (<=1.7m/s)  AoV Peak P.7 mmHg (<20mmHg)  AoV Mean P.0 mmHg (1.7-11.5mmHg)  LVOT Max Raheel:            1.12 m/s (<=1.1m/s)  AoV VTI:                 24.30 cm (18-25cm)  LVOT VTI:                17.40 cm  LVOT Diameter:           2.00 cm  (1.8-2.4cm)  AoV Area, VTI:           2.25 cm2  (2.5-5.5cm2)  AoV Area,Vmax:           2.26 cm2 (2.5-4.5cm2)  AoV Dimensionless Index: 0.72       RIGHT VENTRICLE:  RV Basal 4.17 cm  RV Mid   2.08 cm  RV Major 8.3 cm  TAPSE:   24.2 mm  RV s'    0.13 m/s    TRICUSPID VALVE/RVSP:                              Normal Ranges:  Peak TR Velocity: 3.80 m/s  RV Syst Pressure: 60.8 mmHg (< 30mmHg)  IVC Diam:         1.77 cm    PULMONIC VALVE:                          Normal Ranges:  PV Accel Time: 90 msec  (>120ms)  PV Max Raheel:    1.3 m/s  (0.6-0.9m/s)  PV Max P.5 mmHg       [id number] Kyle De La Cruz DO  Electronically signed on 2024 at 9:02:45 AM       ** Final **  XR chest 2 views  Narrative: Interpreted By:  Krystyna Mccrary,   STUDY:  XR CHEST 2 VIEWS;  2024 3:20 am      INDICATION:  Signs/Symptoms:cough, sob.      COMPARISON:  None.      ACCESSION NUMBER(S):  [id number]      ORDERING CLINICIAN:  DENI RIZO      TECHNIQUE:  Frontal and lateral views of the chest were obtained.      FINDINGS:  The heart is enlarged. There is mild vascular congestion and  interstitial edema.      No focal consolidation, pleural effusion or pneumothorax.      Multilevel degenerative changes are noted at the spine.      Impression: 1. Cardiomegaly. Mild vascular congestion and interstitial edema.              MACRO:  None.      Signed by: Krystyna Mccrary 2024 3:37 AM  Dictation workstation:   [id number]      Physical Exam  Generally no acute distress  HEENT PERRL EOMI  Cardiovascular S1-S2 heard regular rate and rhythm no murmurs rubs or gallops  Lungs clear to auscultation bilaterally  Abdomen bowel sounds present  Extremities no clubbing cyanosis edema    Relevant Results  Scheduled medications  [MAR Hold - Suspended Admission] amLODIPine, 5 mg, oral, Daily  [MAR Hold - Suspended Admission] aspirin, 81 mg, oral, Daily  [MAR Hold - Suspended Admission] atorvastatin, 40 mg, oral, Nightly  [MAR Hold - Suspended Admission] carvedilol, 3.125 mg, oral, BID with meals  [MAR  Hold - Suspended Admission] enoxaparin, 40 mg, subcutaneous, Daily  [MAR Hold - Suspended Admission] famotidine, 20 mg, oral, Daily  [MAR Hold - Suspended Admission] insulin lispro, 0-10 Units, subcutaneous, TID with meals  [MAR Hold - Suspended Admission] losartan, 50 mg, oral, Daily  [MAR Hold - Suspended Admission] perflutren lipid microspheres, 0.5-10 mL of dilution, intravenous, Once in imaging  [MAR Hold - Suspended Admission] perflutren protein A microsphere, 0.5 mL, intravenous, Once in imaging  [MAR Hold - Suspended Admission] sulfur hexafluoride microsphr, 2 mL, intravenous, Once in imaging      Continuous medications     PRN medications  PRN medications: [MAR Hold - Suspended Admission] acetaminophen **OR** [MAR Hold - Suspended Admission] acetaminophen **OR** [MAR Hold - Suspended Admission] acetaminophen, [MAR Hold - Suspended Admission] benzocaine-menthol, [MAR Hold - Suspended Admission] dextromethorphan-guaifenesin, [MAR Hold - Suspended Admission] dextrose, [MAR Hold - Suspended Admission] dextrose, [MAR Hold - Suspended Admission] glucagon, [MAR Hold - Suspended Admission] glucagon, [MAR Hold - Suspended Admission] guaiFENesin, [MAR Hold - Suspended Admission] hydrALAZINE, [MAR Hold - Suspended Admission] ondansetron ODT **OR** [MAR Hold - Suspended Admission] ondansetron, [MAR Hold - Suspended Admission] polyethylene glycol  Results for orders placed or performed during the hospital encounter of 04/26/24 (from the past 24 hour(s))   POCT GLUCOSE   Result Value Ref Range    POCT Glucose 152 (H) 74 - 99 mg/dL   POCT GLUCOSE   Result Value Ref Range    POCT Glucose 116 (H) 74 - 99 mg/dL   POCT GLUCOSE   Result Value Ref Range    POCT Glucose 103 (H) 74 - 99 mg/dL   Basic metabolic panel   Result Value Ref Range    Glucose 120 (H) 65 - 99 mg/dL    Sodium 137 133 - 145 mmol/L    Potassium 3.8 3.4 - 5.1 mmol/L    Chloride 103 97 - 107 mmol/L    Bicarbonate 23 (L) 24 - 31 mmol/L    Urea Nitrogen 24 8 -  25 mg/dL    Creatinine 1.30 0.40 - 1.60 mg/dL    eGFR 64 >60 mL/min/1.73m*2    Calcium 8.9 8.5 - 10.4 mg/dL    Anion Gap 11 <=19 mmol/L   POCT GLUCOSE   Result Value Ref Range    POCT Glucose 137 (H) 74 - 99 mg/dL       Assessment/Plan   Impression: 58-year-old [place]-speaking gentleman admitted with shortness of breath and found to have new systolic heart failure.    Plan:    1.  Systolic heart failure  -Appreciate cardiology input, plan for cardiac catheterization today to determine ischemic versus nonischemic  -Continue with medical management per cardiology    2.  Hyperglycemia    3. Prediabetes  -Discussed lifestyle modifications    3.  Social  -patient is a legal resident and here to work over the summer, had Medicaid in [place] will need to resubmit paperwork if needed for [place] Medicaid.  Social work to help initiate process.         Paula Lopez MD

## 2024-04-29 NOTE — CARE PLAN
The patient's goals for the shift include      The clinical goals for the shift include NPO @ midnight to prep for procedure      Problem: Pain  Goal: My pain/discomfort is manageable  Outcome: Progressing     Problem: Safety  Goal: Patient will be injury free during hospitalization  Outcome: Progressing  Goal: I will remain free of falls  Outcome: Progressing     Problem: Discharge Barriers  Goal: My discharge needs are met  Outcome: Progressing     Problem: Psychosocial Needs  Goal: Demonstrates ability to cope with hospitalization/illness  Outcome: Progressing  Goal: Collaborate with me, my family, and caregiver to identify my specific goals  Outcome: Progressing

## 2024-04-29 NOTE — PROGRESS NOTES
04/29/24 1535   Discharge Planning   Living Arrangements Friends   Support Systems Friends/neighbors;Family members   Assistance Needed independent   Type of Residence Private residence   Home or Post Acute Services None   Patient expects to be discharged to: Home with no needs   Does the patient need discharge transport arranged? No     Patient is scheduled for discharge and will be discharged to home with no needs.     PLAN Discharge to home with no needs

## 2024-04-29 NOTE — CARE PLAN
Problem: Pain  Goal: My pain/discomfort is manageable  Outcome: Progressing     Problem: Safety  Goal: Patient will be injury free during hospitalization  Outcome: Progressing  Goal: I will remain free of falls  Outcome: Progressing     Problem: Daily Care  Goal: Daily care needs are met  Outcome: Progressing

## 2024-04-29 NOTE — POST-PROCEDURE NOTE
Physician Transition of Care Summary  Invasive Cardiovascular Lab    Procedure Date: 4/29/2024  Attending:    * Kyle De La Cruz - Primary  Resident/Fellow/Other Assistant: Surgeons and Role:  * No surgeons found with a matching role *    Indications:   Pre-op Diagnosis     * NSTEMI (non-ST elevated myocardial infarction) (Multi) [I21.4]    Post-procedure diagnosis:   Post-op Diagnosis     * NSTEMI (non-ST elevated myocardial infarction) (Multi) [I21.4]    Procedure(s):   Left Heart Cath  51171 - DE CATH PLMT L HRT & ARTS W/NJX & ANGIO IMG S&I        Procedure Findings:   Mild nonobstructive coronary artery disease    Description of the Procedure:   The patient was brought to the Cath Lab in fasting state and prepped and draped in sterile fashion with particular attention to the right wrist.  A formal timeout was performed to ensure proper patient as well as site indication procedure.  1% lidocaine solution was used to anesthetize the area overlying the right radial artery just proximal to wrist.  A standard radial needle was used for access and ultimately upsized to a 6 Kinyarwanda radial sheath.  An intra-arterial admixture of 5 mg verapamil and 200 mcg nitroglycerin was injected via the sheath.  We then proceed with the diagnostic portion exam advancing a 6 Kinyarwanda JR 5 diagnostic catheter over a hydrophilic baby J-wire to the level of the coronary cusps.  The wire was withdrawn and the catheter was aspirated and flushed.  We engaged the catheter into the right coronary artery performed angiography and disengage.  We then remove this catheter and exchanged it in standard exchange fashion for a 6 Kinyarwanda AL 3.5 diagnostic catheter.  The wire was withdrawn and the catheter was aspirated and flushed.  We then engaged the catheter into the left main performed angiography and disengaged.   The catheter was removed over a wire.  A TR band was placed with adequate hemostasis upon removal of the sheath.    LMCA-large-caliber  vessel bifurcates in the LAD and left circumflex arteries.  It contains luminal irregularities.    LAD-also large caliber vessel giving rise to 2 early diagonal branches and a recurrent apical branch.  The LAD contains luminal irregularities.    Circumflex-large-caliber vessel though nondominant for posterior circulation.  It gives rise to 2 obtuse marginals and a posterolateral network.  There are luminal irregularities throughout the circumflex system.    RCA-dominant vessel giving rise to the right posterior descending artery.  There are luminal irregularities seen throughout the vessel.    Complications:   None    Stents/Implants:   Implants       No implant documentation for this case.            Anticoagulation/Antiplatelet Plan:   N/A    Estimated Blood Loss:   10 mL    Anesthesia: Moderate Sedation Anesthesia Staff: No anesthesia staff entered.    Any Specimen(s) Removed:   No specimens collected during this procedure.    Disposition:   To [facility]      Electronically signed by: Kyle De La Cruz DO, 4/29/2024 10:37 AM

## 2024-04-29 NOTE — DISCHARGE SUMMARY
Discharge Diagnosis  CHF (congestive heart failure), NYHA class I, acute on chronic, combined (Multi)    Issues Requiring Follow-Up  Heart failure     Discharge Meds     Your medication list        START taking these medications        Instructions Last Dose Given Next Dose Due   amLODIPine 5 mg tablet  Commonly known as: Norvasc  Start taking on: April 30, 2024      Take 1 tablet (5 mg) by mouth once daily.       atorvastatin 40 mg tablet  Commonly known as: Lipitor      Take 1 tablet (40 mg) by mouth once daily at bedtime.       carvedilol 3.125 mg tablet  Commonly known as: Coreg      Take 1 tablet (3.125 mg) by mouth 2 times a day with meals.       furosemide 20 mg tablet  Commonly known as: Lasix      Take 1 tablet (20 mg) by mouth once daily.              CONTINUE taking these medications        Instructions Last Dose Given Next Dose Due   losartan 50 mg tablet  Commonly known as: Cozaar           metFORMIN 500 mg tablet  Commonly known as: Glucophage                     Where to Get Your Medications        These medications were sent to [facility]  [address], [address], [facility] [address]      Hours: 9 AM to 6 PM Mon-Fri, 9 AM to 1 PM Sat Phone: [phone]   amLODIPine 5 mg tablet  atorvastatin 40 mg tablet  carvedilol 3.125 mg tablet  furosemide 20 mg tablet         Test Results Pending At Discharge  Pending Labs       Order Current Status    Serial Troponin, 2 Hour ([facility]) Collected (04/26/24 0744)            Hospital Course   Tomas Hough is a 58 y.o. male presenting with Shortness of Breath.        Patient presented with shortness of breath.  He stated he felt like something was stuck in his throat.  This has been ongoing for over 1 week.  It seems to be worse when the patient lays down at night or when he is active.  Corwin has been wheezing as well.  He is otherwise healthy.  He does not smoke.        Patient's ED diagnostic workup noted for minimal leukocytosis of 11.7.  The H&H  was stable at 14.3/43.8.  The platelet count was stable at 244.  The  differential count was unremarkable.  The blood chemistry was noted for a glucose of 107.  Otherwise no electrolyte aberrancies.  Patient's proBNP was 1059.  PA and lateral chest x-ray was noted for cardiomegaly.  Mild vascular congestion and interstitial edema.        Vital signs in the ED were noted for Tmax 37.2, pulse rate 89, respiratory 14, /78.  Saturating 94% on room air.     The patient was treated with IV lasix. Norvasc, a BB, and statin were added. He is dong much better and is on room air saturating normally. Left heart catheterization revealed mild CAD. He wants to go home, he was cleared for discharge by cardiology. He was discharged on a low dose lasix, BB with close fu with cardiology    Pertinent Physical Exam At Time of Discharge  Physical Exam  Generally no acute distress  HEENT PERRL EOMI  Cardiovascular S1-S2 heard regular rate and rhythm no murmurs rubs or gallops  Lungs clear to auscultation bilaterally  Abdomen bowel sounds present  Extremities no clubbing cyanosis edema  Outpatient Follow-Up  No future appointments.      Paula Lopez MD

## 2024-05-02 ENCOUNTER — TELEPHONE (OUTPATIENT)
Dept: CARDIOLOGY | Facility: CLINIC | Age: 59
End: 2024-05-02

## 2024-05-13 ENCOUNTER — OFFICE VISIT (OUTPATIENT)
Dept: CARDIOLOGY | Facility: CLINIC | Age: 59
End: 2024-05-13

## 2024-05-13 VITALS
DIASTOLIC BLOOD PRESSURE: 80 MMHG | BODY MASS INDEX: 31.47 KG/M2 | SYSTOLIC BLOOD PRESSURE: 130 MMHG | HEART RATE: 65 BPM | WEIGHT: 207 LBS

## 2024-05-13 DIAGNOSIS — I50.43 CHF (CONGESTIVE HEART FAILURE), NYHA CLASS I, ACUTE ON CHRONIC, COMBINED (MULTI): Primary | ICD-10-CM

## 2024-05-13 DIAGNOSIS — I10 PRIMARY HYPERTENSION: ICD-10-CM

## 2024-05-13 DIAGNOSIS — I21.4 NSTEMI (NON-ST ELEVATED MYOCARDIAL INFARCTION) (MULTI): ICD-10-CM

## 2024-05-13 DIAGNOSIS — I42.9 CARDIOMYOPATHY, UNSPECIFIED TYPE (MULTI): ICD-10-CM

## 2024-05-13 PROCEDURE — 3079F DIAST BP 80-89 MM HG: CPT | Performed by: INTERNAL MEDICINE

## 2024-05-13 PROCEDURE — 3075F SYST BP GE 130 - 139MM HG: CPT | Performed by: INTERNAL MEDICINE

## 2024-05-13 PROCEDURE — 99214 OFFICE O/P EST MOD 30 MIN: CPT | Performed by: INTERNAL MEDICINE

## 2024-05-13 PROCEDURE — 1036F TOBACCO NON-USER: CPT | Performed by: INTERNAL MEDICINE

## 2024-05-13 ASSESSMENT — ENCOUNTER SYMPTOMS
DEPRESSION: 0
COUGH: 0
DIAPHORESIS: 0
OCCASIONAL FEELINGS OF UNSTEADINESS: 0
PND: 0
CLAUDICATION: 0
IRREGULAR HEARTBEAT: 0
PALPITATIONS: 0
WEIGHT GAIN: 0
DYSPNEA ON EXERTION: 0
WEIGHT LOSS: 0
FEVER: 0
ORTHOPNEA: 0
WEAKNESS: 0
SYNCOPE: 0
WHEEZING: 0
SHORTNESS OF BREATH: 0
DIZZINESS: 0
NEAR-SYNCOPE: 0
LOSS OF SENSATION IN FEET: 0
MYALGIAS: 0

## 2024-05-13 ASSESSMENT — PATIENT HEALTH QUESTIONNAIRE - PHQ9
SUM OF ALL RESPONSES TO PHQ9 QUESTIONS 1 AND 2: 0
2. FEELING DOWN, DEPRESSED OR HOPELESS: NOT AT ALL
1. LITTLE INTEREST OR PLEASURE IN DOING THINGS: NOT AT ALL

## 2024-05-13 NOTE — PROGRESS NOTES
Subjective      Chief Complaint   Patient presents with    Follow-up        58-year-old Macanese-speaking male seen in the hospital last month with a late presenting myocardial infarction.  He was diagnosed in [place] with diabetes apparently and was placed on metformin.  He has not had any medical contact in the [place].  He had an echocardiogram showing an ejection fraction of 25 to 30% which is new.  He was euvolemic throughout his hospital stay, we added guideline directed medical therapy for cardiomyopathy and systolic dysfunction.  He was catheterized to find mild to moderate nonobstructive coronary artery disease.  He was discharged on medical therapy.         Review of Systems   Constitutional: Negative for diaphoresis, fever, weight gain and weight loss.   Eyes:  Negative for visual disturbance.   Cardiovascular:  Negative for chest pain, claudication, dyspnea on exertion, irregular heartbeat, leg swelling, near-syncope, orthopnea, palpitations, paroxysmal nocturnal dyspnea and syncope.   Respiratory:  Negative for cough, shortness of breath and wheezing.    Musculoskeletal:  Negative for muscle weakness and myalgias.   Neurological:  Negative for dizziness and weakness.   All other systems reviewed and are negative.       Past Medical History:   Diagnosis Date    Diabetes mellitus (Multi)     Hypertension         Past Surgical History:   Procedure Laterality Date    CARDIAC CATHETERIZATION N/A 4/29/2024    Procedure: Left Heart Cath;  Surgeon: Kyle De La Cruz DO;  Location: [facility] Cardiac Cath Lab;  Service: Cardiovascular;  Laterality: N/A;        Social History     Socioeconomic History    Marital status: Unknown     Spouse name: Not on file    Number of children: Not on file    Years of education: Not on file    Highest education level: Not on file   Occupational History    Not on file   Tobacco Use    Smoking status: Former     Types: Cigarettes    Smokeless tobacco: Never   Substance and Sexual  Activity    Alcohol use: Not Currently    Drug use: Defer    Sexual activity: Defer   Other Topics Concern    Not on file   Social History Narrative    Not on file     Social Determinants of Health     Financial Resource Strain: Patient Unable To Answer (4/26/2024)    Overall Financial Resource Strain (CARDIA)     Difficulty of Paying Living Expenses: Patient unable to answer   Food Insecurity: Patient Unable To Answer (4/26/2024)    Hunger Vital Sign     Worried About Running Out of Food in the Last Year: Patient unable to answer     Ran Out of Food in the Last Year: Patient unable to answer   Transportation Needs: Patient Unable To Answer (4/26/2024)    PRAPARE - Transportation     Lack of Transportation (Medical): Patient unable to answer     Lack of Transportation (Non-Medical): Patient unable to answer   Physical Activity: Patient Unable To Answer (4/26/2024)    Exercise Vital Sign     Days of Exercise per Week: Patient unable to answer     Minutes of Exercise per Session: Patient unable to answer   Stress: Patient Unable To Answer (4/26/2024)    [employer] - Occupational Stress Questionnaire     Feeling of Stress : Patient unable to answer   Social Connections: Not on file   Intimate Partner Violence: Not on file   Housing Stability: Patient Unable To Answer (4/26/2024)    Housing Stability Vital Sign     Unable to Pay for Housing in the Last Year: Patient unable to answer     Number of Places Lived in the Last Year: 1     Unstable Housing in the Last Year: Patient unable to answer        No family history on file.     OBJECTIVE:    Vitals:    05/13/24 1534   BP: 130/80   Pulse: 65        Physical Exam     Lab Review:   Lab Results   Component Value Date     04/29/2024    K 3.8 04/29/2024     04/29/2024    CO2 23 (L) 04/29/2024    BUN 24 04/29/2024    CREATININE 1.30 04/29/2024    GLUCOSE 120 (H) 04/29/2024    CALCIUM 8.9 04/29/2024     Lab Results   Component Value  Date    WBC 11.7 (H) 04/26/2024    HGB 14.3 04/26/2024    HCT 43.8 04/26/2024    MCV 86 04/26/2024     04/26/2024     Lab Results   Component Value Date    CHOL 193 04/26/2024    TRIG 94 04/26/2024    HDL 46.0 04/26/2024       Lab Results   Component Value Date    LDLCALC 128 04/26/2024        Cardiomyopathy (Multi)  Ischemic with very mild nonobstructive coronary artery disease seen on his cardiac catheterization.  We have him on guideline directed medical therapy and he is doing rather well.    HTN (hypertension)  High end of normal today.  Will continue current medications as is.    CHF (congestive heart failure), NYHA class I, acute on chronic, combined (Multi)  Euvolemic.  Will get an echocardiogram in 2 months and have him follow-up with me in the office in 3 months.

## 2024-05-13 NOTE — ASSESSMENT & PLAN NOTE
Ischemic with very mild nonobstructive coronary artery disease seen on his cardiac catheterization.  We have him on guideline directed medical therapy and he is doing rather well.

## 2024-05-13 NOTE — ASSESSMENT & PLAN NOTE
Euvolemic.  Will get an echocardiogram in 2 months and have him follow-up with me in the office in 3 months.

## 2024-08-06 ENCOUNTER — HOSPITAL ENCOUNTER (EMERGENCY)
Facility: HOSPITAL | Age: 59
Discharge: HOME | End: 2024-08-06

## 2024-08-06 VITALS
OXYGEN SATURATION: 99 % | RESPIRATION RATE: 16 BRPM | HEART RATE: 89 BPM | WEIGHT: 207.45 LBS | SYSTOLIC BLOOD PRESSURE: 174 MMHG | HEIGHT: 69 IN | BODY MASS INDEX: 30.73 KG/M2 | TEMPERATURE: 98.2 F | DIASTOLIC BLOOD PRESSURE: 105 MMHG

## 2024-08-06 DIAGNOSIS — J02.9 ACUTE PHARYNGITIS, UNSPECIFIED ETIOLOGY: Primary | ICD-10-CM

## 2024-08-06 LAB
S PYO DNA THROAT QL NAA+PROBE: NOT DETECTED
SARS-COV-2 RNA RESP QL NAA+PROBE: NOT DETECTED

## 2024-08-06 PROCEDURE — 2500000001 HC RX 250 WO HCPCS SELF ADMINISTERED DRUGS (ALT 637 FOR MEDICARE OP): Performed by: NURSE PRACTITIONER

## 2024-08-06 PROCEDURE — 99283 EMERGENCY DEPT VISIT LOW MDM: CPT

## 2024-08-06 PROCEDURE — 87635 SARS-COV-2 COVID-19 AMP PRB: CPT | Performed by: NURSE PRACTITIONER

## 2024-08-06 PROCEDURE — 87651 STREP A DNA AMP PROBE: CPT | Performed by: NURSE PRACTITIONER

## 2024-08-06 RX ORDER — AZITHROMYCIN 250 MG/1
500 TABLET, FILM COATED ORAL DAILY
Qty: 10 TABLET | Refills: 0 | Status: SHIPPED | OUTPATIENT
Start: 2024-08-06 | End: 2024-08-11

## 2024-08-06 RX ORDER — AZITHROMYCIN 500 MG/1
500 TABLET, FILM COATED ORAL ONCE
Status: COMPLETED | OUTPATIENT
Start: 2024-08-06 | End: 2024-08-06

## 2024-08-06 ASSESSMENT — PAIN SCALES - GENERAL: PAINLEVEL_OUTOF10: 0 - NO PAIN

## 2024-08-06 ASSESSMENT — COLUMBIA-SUICIDE SEVERITY RATING SCALE - C-SSRS
6. HAVE YOU EVER DONE ANYTHING, STARTED TO DO ANYTHING, OR PREPARED TO DO ANYTHING TO END YOUR LIFE?: NO
1. IN THE PAST MONTH, HAVE YOU WISHED YOU WERE DEAD OR WISHED YOU COULD GO TO SLEEP AND NOT WAKE UP?: NO
2. HAVE YOU ACTUALLY HAD ANY THOUGHTS OF KILLING YOURSELF?: NO

## 2024-08-06 ASSESSMENT — PAIN DESCRIPTION - LOCATION: LOCATION: THROAT

## 2024-08-06 ASSESSMENT — PAIN DESCRIPTION - PAIN TYPE: TYPE: ACUTE PAIN

## 2024-08-06 ASSESSMENT — PAIN - FUNCTIONAL ASSESSMENT: PAIN_FUNCTIONAL_ASSESSMENT: 0-10

## 2024-08-06 ASSESSMENT — PAIN DESCRIPTION - FREQUENCY: FREQUENCY: INTERMITTENT

## 2024-08-07 NOTE — ED PROVIDER NOTES
HPI   Chief Complaint   Patient presents with    Sore Throat     Pt has a discomfort in his throat that isnt letting him sleep for the last 3 days.        HPI  See my MDM      Patient History   Past Medical History:   Diagnosis Date    Diabetes mellitus (Multi)     Hypertension      Past Surgical History:   Procedure Laterality Date    CARDIAC CATHETERIZATION N/A 4/29/2024    Procedure: Left Heart Cath;  Surgeon: Kyle De La Cruz DO;  Location: [facility] Cardiac Cath Lab;  Service: Cardiovascular;  Laterality: N/A;     No family history on file.  Social History     Tobacco Use    Smoking status: Former     Types: Cigarettes    Smokeless tobacco: Never   Substance Use Topics    Alcohol use: Not Currently    Drug use: Defer       Physical Exam   ED Triage Vitals [08/06/24 1909]   Temperature Heart Rate Respirations BP   36.8 °C (98.2 °F) 89 16 (!) 174/105      Pulse Ox Temp Source Heart Rate Source Patient Position   97 % Temporal Monitor Sitting      BP Location FiO2 (%)     Right arm --       Physical Exam  CONSTITUTIONAL: Vital signs reviewed as charted, well-developed and in no distress  Eyes: Extraocular muscles are intact. Pupils equal round and reactive to light. Conjunctiva are pink.    ENT: Mucous membranes are moist. Tongue in the midline. Pharynx with mild edema, erythema present.  No exudate uvula midline  LUNGS: Breath sounds equal and clear to auscultation. Good air exchange, no wheezes rales or retractions, pulse oximetry is charted.  HEART: Regular rate and rhythm without murmur thrill or rub, strong tones, auscultation is normal.  ABDOMEN: Soft and nontender without guarding rebound rigidity or mass. Bowel sounds are present and normal in all quadrants. There is no palpable masses or aneurysms identified. No hepatosplenomegaly, normal abdominal exam.  Neuro: The patient is awake, alert and oriented ×3. Moving all 4 extremities and answering questions appropriately.   MUSCULOSKELETAL: The calves are nontender  to palpation. Full gross active range of motion.   PSYCH: Awake alert oriented, normal mood and affect.  Skin:  Dry, normal color, warm to the touch, no rash present.        ED Course & MDM   Diagnoses as of 08/06/24 2110   Acute pharyngitis, unspecified etiology                 No data recorded                                 Medical Decision Making  History obtained from: patient    Vital signs, nursing notes, current medications, past medical history, Surgical history, allergies, social history, family History were reviewed.         HPI:  Patient 58-year-old male present emergency room today complaint of sore throat sinus congestion ongoing for the last couple of days.  States made it difficult for him to sleep at nighttime.  Denies dizziness, chest pain, shortness of breath, abdominal pain extremity edema.  Patient is primarily Ukrainian-speaking exam was done using a [employer] interpretation services.      10 point ROS was reviewed and negative except Noted above in HPI.  DDX: as listed above          MDM Summary/considerations:  Labs Reviewed   GROUP A STREPTOCOCCUS, PCR - Normal       Result Value    Group A Strep PCR Not Detected     SARS-COV-2 PCR - Normal    Coronavirus 2019, PCR Not Detected      Narrative:     This assay has received FDA Emergency Use Authorization (EUA) and is only authorized for the duration of time that circumstances exist to justify the authorization of the emergency use of in vitro diagnostic tests for the detection of SARS-CoV-2 virus and/or diagnosis of COVID-19 infection under section 564(b)(1) of the Act, 21 U.S.C. 360bbb-3(b)(1). This assay is an in vitro diagnostic nucleic acid amplification test for the qualitative detection of SARS-CoV-2 from nasopharyngeal specimens and has been validated for use at [facility]. Negative results do not preclude COVID-19 infections and should not be used as the sole basis for diagnosis, treatment, or other management  decisions.       No orders to display     Medications   azithromycin (Zithromax) tablet 500 mg (has no administration in time range)     New Prescriptions    AZITHROMYCIN (ZITHROMAX Z-NILS) 250 MG TABLET    Take 2 tablets (500 mg) by mouth once daily for 5 days.       I estimate there is LOW risk for EPIGLOTTITIS, PNEUMONIA, MENINGITIS, OR URINARY TRACT INFECTION, thus I consider the discharge disposition reasonable. Also, there is no evidence for peritonitis, sepsis, or toxicity. We have discussed the diagnosis and risks, and we agree with discharging home to follow-up with their primary doctor. We also discussed returning to the Emergency Department immediately if new or worsening symptoms occur. We have discussed the symptoms which are most concerning (e.g., changing or worsening pain, trouble swallowing or breathing, neck stiffness, fever) that necessitate immediate return.    Strep and COVID-19 negative, will start on antibiotics, will follow PCP 1 to 2 days for reevaluation was discharged home stable condition    All of the patient's questions were answered to the best of my ability.  Patient states understanding that they have been screened for an emergency today and we have not found any etiology of symptoms that requires emergent treatment or admission to the hospital at this point. They understand that they have not had definitive care day and require follow-up for treatment of their condition. They also state understanding that they may have an emergent condition that may potentially have not of detected at this visit and they must return to the emergency department if they develop any worsening of symptoms or new complaints.      I have evaluated this patient, my supervising physician was available for consultation.        Not warranted at this time  Critical Care:        This chart was completed using voice recognition transcription software. Please excuse any errors of transcription including  grammatical, punctuation, syntax and spelling errors.  Please contact me with any questions regarding this chart.    Procedure  Procedures     KERRI Marks-ULISSES  08/06/24 1444

## 2024-08-26 ENCOUNTER — HOSPITAL ENCOUNTER (EMERGENCY)
Facility: HOSPITAL | Age: 59
Discharge: HOME | End: 2024-08-26
Attending: STUDENT IN AN ORGANIZED HEALTH CARE EDUCATION/TRAINING PROGRAM

## 2024-08-26 ENCOUNTER — APPOINTMENT (OUTPATIENT)
Dept: CARDIOLOGY | Facility: HOSPITAL | Age: 59
End: 2024-08-26

## 2024-08-26 ENCOUNTER — APPOINTMENT (OUTPATIENT)
Dept: RADIOLOGY | Facility: HOSPITAL | Age: 59
End: 2024-08-26

## 2024-08-26 VITALS
RESPIRATION RATE: 20 BRPM | DIASTOLIC BLOOD PRESSURE: 99 MMHG | HEIGHT: 68 IN | TEMPERATURE: 98.2 F | SYSTOLIC BLOOD PRESSURE: 141 MMHG | BODY MASS INDEX: 30.84 KG/M2 | OXYGEN SATURATION: 95 % | HEART RATE: 71 BPM | WEIGHT: 203.48 LBS

## 2024-08-26 DIAGNOSIS — K21.9 CHEST PAIN DUE TO GERD: ICD-10-CM

## 2024-08-26 DIAGNOSIS — R79.89 ELEVATED TROPONIN: ICD-10-CM

## 2024-08-26 DIAGNOSIS — R07.9 ACUTE CHEST PAIN: Primary | ICD-10-CM

## 2024-08-26 DIAGNOSIS — R07.9 CHEST PAIN DUE TO GERD: ICD-10-CM

## 2024-08-26 DIAGNOSIS — R06.01 ORTHOPNEA: ICD-10-CM

## 2024-08-26 DIAGNOSIS — R79.89 ELEVATED BRAIN NATRIURETIC PEPTIDE (BNP) LEVEL: ICD-10-CM

## 2024-08-26 DIAGNOSIS — R05.1 ACUTE COUGH: ICD-10-CM

## 2024-08-26 DIAGNOSIS — I21.4 NSTEMI (NON-ST ELEVATED MYOCARDIAL INFARCTION) (MULTI): ICD-10-CM

## 2024-08-26 DIAGNOSIS — I50.43 CHF (CONGESTIVE HEART FAILURE), NYHA CLASS I, ACUTE ON CHRONIC, COMBINED (MULTI): ICD-10-CM

## 2024-08-26 LAB
ALBUMIN SERPL-MCNC: 4.1 G/DL (ref 3.5–5)
ALP BLD-CCNC: 95 U/L (ref 35–125)
ALT SERPL-CCNC: 8 U/L (ref 5–40)
ANION GAP SERPL CALC-SCNC: 13 MMOL/L
AST SERPL-CCNC: 15 U/L (ref 5–40)
ATRIAL RATE: 86 BPM
BASOPHILS # BLD MANUAL: 0.27 X10*3/UL (ref 0–0.1)
BASOPHILS NFR BLD MANUAL: 3 %
BILIRUB SERPL-MCNC: 0.7 MG/DL (ref 0.1–1.2)
BUN SERPL-MCNC: 20 MG/DL (ref 8–25)
CALCIUM SERPL-MCNC: 9 MG/DL (ref 8.5–10.4)
CHLORIDE SERPL-SCNC: 106 MMOL/L (ref 97–107)
CO2 SERPL-SCNC: 22 MMOL/L (ref 24–31)
CREAT SERPL-MCNC: 1.1 MG/DL (ref 0.4–1.6)
EGFRCR SERPLBLD CKD-EPI 2021: 79 ML/MIN/1.73M*2
EOSINOPHIL # BLD MANUAL: 0 X10*3/UL (ref 0–0.7)
EOSINOPHIL NFR BLD MANUAL: 0 %
ERYTHROCYTE [DISTWIDTH] IN BLOOD BY AUTOMATED COUNT: 13.4 % (ref 11.5–14.5)
GLUCOSE SERPL-MCNC: 154 MG/DL (ref 65–99)
HCT VFR BLD AUTO: 40.5 % (ref 41–52)
HGB BLD-MCNC: 13.1 G/DL (ref 13.5–17.5)
IMM GRANULOCYTES # BLD AUTO: 0.03 X10*3/UL (ref 0–0.7)
IMM GRANULOCYTES NFR BLD AUTO: 0.3 % (ref 0–0.9)
LYMPHOCYTES # BLD MANUAL: 2.4 X10*3/UL (ref 1.2–4.8)
LYMPHOCYTES NFR BLD MANUAL: 27 %
MAGNESIUM SERPL-MCNC: 1.9 MG/DL (ref 1.6–3.1)
MCH RBC QN AUTO: 27.9 PG (ref 26–34)
MCHC RBC AUTO-ENTMCNC: 32.3 G/DL (ref 32–36)
MCV RBC AUTO: 86 FL (ref 80–100)
MONOCYTES # BLD MANUAL: 0.98 X10*3/UL (ref 0.1–1)
MONOCYTES NFR BLD MANUAL: 11 %
NEUTS SEG # BLD MANUAL: 5.16 X10*3/UL (ref 1.2–7)
NEUTS SEG NFR BLD MANUAL: 58 %
NRBC BLD-RTO: 0 /100 WBCS (ref 0–0)
NT-PROBNP SERPL-MCNC: 1794 PG/ML (ref 0–177)
P AXIS: 52 DEGREES
P OFFSET: 187 MS
P ONSET: 116 MS
PLATELET # BLD AUTO: 195 X10*3/UL (ref 150–450)
POTASSIUM SERPL-SCNC: 4 MMOL/L (ref 3.4–5.1)
PR INTERVAL: 166 MS
PROT SERPL-MCNC: 7.2 G/DL (ref 5.9–7.9)
Q ONSET: 199 MS
QRS COUNT: 14 BEATS
QRS DURATION: 168 MS
QT INTERVAL: 432 MS
QTC CALCULATION(BAZETT): 516 MS
QTC FREDERICIA: 487 MS
R AXIS: -7 DEGREES
RBC # BLD AUTO: 4.7 X10*6/UL (ref 4.5–5.9)
RBC MORPH BLD: ABNORMAL
SODIUM SERPL-SCNC: 141 MMOL/L (ref 133–145)
T AXIS: 104 DEGREES
T OFFSET: 415 MS
TOTAL CELLS COUNTED BLD: 100
TROPONIN T SERPL-MCNC: 15 NG/L
TROPONIN T SERPL-MCNC: 15 NG/L
VARIANT LYMPHS # BLD MANUAL: 0.09 X10*3/UL (ref 0–0.5)
VARIANT LYMPHS NFR BLD: 1 %
VENTRICULAR RATE: 86 BPM
WBC # BLD AUTO: 8.9 X10*3/UL (ref 4.4–11.3)

## 2024-08-26 PROCEDURE — 2500000004 HC RX 250 GENERAL PHARMACY W/ HCPCS (ALT 636 FOR OP/ED): Performed by: STUDENT IN AN ORGANIZED HEALTH CARE EDUCATION/TRAINING PROGRAM

## 2024-08-26 PROCEDURE — 80053 COMPREHEN METABOLIC PANEL: CPT | Performed by: STUDENT IN AN ORGANIZED HEALTH CARE EDUCATION/TRAINING PROGRAM

## 2024-08-26 PROCEDURE — 84484 ASSAY OF TROPONIN QUANT: CPT | Performed by: STUDENT IN AN ORGANIZED HEALTH CARE EDUCATION/TRAINING PROGRAM

## 2024-08-26 PROCEDURE — 85007 BL SMEAR W/DIFF WBC COUNT: CPT | Performed by: STUDENT IN AN ORGANIZED HEALTH CARE EDUCATION/TRAINING PROGRAM

## 2024-08-26 PROCEDURE — 36415 COLL VENOUS BLD VENIPUNCTURE: CPT | Performed by: STUDENT IN AN ORGANIZED HEALTH CARE EDUCATION/TRAINING PROGRAM

## 2024-08-26 PROCEDURE — 71046 X-RAY EXAM CHEST 2 VIEWS: CPT | Performed by: RADIOLOGY

## 2024-08-26 PROCEDURE — 93005 ELECTROCARDIOGRAM TRACING: CPT

## 2024-08-26 PROCEDURE — 96374 THER/PROPH/DIAG INJ IV PUSH: CPT

## 2024-08-26 PROCEDURE — 83735 ASSAY OF MAGNESIUM: CPT | Performed by: STUDENT IN AN ORGANIZED HEALTH CARE EDUCATION/TRAINING PROGRAM

## 2024-08-26 PROCEDURE — 71046 X-RAY EXAM CHEST 2 VIEWS: CPT

## 2024-08-26 PROCEDURE — 83880 ASSAY OF NATRIURETIC PEPTIDE: CPT | Performed by: STUDENT IN AN ORGANIZED HEALTH CARE EDUCATION/TRAINING PROGRAM

## 2024-08-26 PROCEDURE — 99284 EMERGENCY DEPT VISIT MOD MDM: CPT | Mod: 25

## 2024-08-26 PROCEDURE — 85027 COMPLETE CBC AUTOMATED: CPT | Performed by: STUDENT IN AN ORGANIZED HEALTH CARE EDUCATION/TRAINING PROGRAM

## 2024-08-26 RX ORDER — FUROSEMIDE 10 MG/ML
60 INJECTION INTRAMUSCULAR; INTRAVENOUS ONCE
Status: COMPLETED | OUTPATIENT
Start: 2024-08-26 | End: 2024-08-26

## 2024-08-26 RX ORDER — FAMOTIDINE 20 MG/1
20 TABLET, FILM COATED ORAL 2 TIMES DAILY
Qty: 30 TABLET | Refills: 0 | Status: SHIPPED | OUTPATIENT
Start: 2024-08-26 | End: 2024-09-10

## 2024-08-26 RX ORDER — ACETAMINOPHEN 500 MG
1000 TABLET ORAL EVERY 6 HOURS PRN
Qty: 30 TABLET | Refills: 0 | Status: SHIPPED | OUTPATIENT
Start: 2024-08-26 | End: 2024-09-25

## 2024-08-26 ASSESSMENT — PAIN DESCRIPTION - DESCRIPTORS: DESCRIPTORS: TIGHTNESS

## 2024-08-26 ASSESSMENT — PAIN SCALES - GENERAL: PAINLEVEL_OUTOF10: 8

## 2024-08-26 ASSESSMENT — PAIN DESCRIPTION - ONSET: ONSET: SUDDEN

## 2024-08-26 ASSESSMENT — PAIN DESCRIPTION - PAIN TYPE: TYPE: ACUTE PAIN

## 2024-08-26 ASSESSMENT — PAIN DESCRIPTION - LOCATION: LOCATION: CHEST

## 2024-08-26 ASSESSMENT — PAIN DESCRIPTION - FREQUENCY: FREQUENCY: INTERMITTENT

## 2024-08-26 ASSESSMENT — PAIN DESCRIPTION - ORIENTATION: ORIENTATION: MID

## 2024-08-26 ASSESSMENT — PAIN - FUNCTIONAL ASSESSMENT: PAIN_FUNCTIONAL_ASSESSMENT: 0-10

## 2024-08-26 ASSESSMENT — PAIN DESCRIPTION - PROGRESSION: CLINICAL_PROGRESSION: NOT CHANGED

## 2024-08-26 NOTE — DISCHARGE INSTRUCTIONS
Thank you for allowing myself and the team to take care of you during your emergency situation and addressing your health concerns.    You were evaluated for shortness of breathy/dyspnea on exertion.     During your visit in the emergency department you were evaluated for your presenting symptoms.  Based on the extensive workup you received,  all efforts were made to identify the source of your symptoms and identify any life-threatening conditions.  These life-threatening conditions that were attempted to be identified and medically managed/treated include but not limited to asthma/chronic obstructive pulmonary disease exacerbation, a blood clot in the lungs, inflammation/fluid around your heart, air/fluid between your lungs and chest wall, lung infection/pneumonia, and air/fluid (infection) within the middle part of your chest, heart failure (weakening of your hear) or fluid overload, kidney dysfunction/failure.  Additionally, you may be experiencing symptoms that are non-life-threatening but are uncomfortable and disruptive to your everyday life.  All efforts were made to manage your symptoms while in the emergency department along with appropriate at home therapy for symptomatic management during your recovery. Please be aware that not all of the conditions explained/discussed in these discharge instructions are applicable to your condition but encompass many of the conditions that were evaluated for during your ED encounter.      During your evaluation and assessment, all measures were taken to evaluate you and address your health concerns to identify dangerous and life threatening health conditions. It is not possible to identify all health conditions or pathologies and eliminate any chance of unfavorable outcomes while in the Emergency Department. My team encourages you to be vigilant with your health and follow-up with the appropriate providers outlined in your discharge paperwork. Please return to the  Emergency Department if you feel that your health has not improved or experiencing worsening symptoms.    Thank you for allowing myself and the team to take care of you during your emergency situation and addressing your health concerns.    You were evaluated for chest pain.     During your visit in the emergency department you were evaluated for your presenting symptoms.  Based on the extensive workup you received, your major adverse cardiac event (MACE)/heart attack is 12-16% or less, which means there is no complete diagnostic/workup taken completely eliminate an event from occurring.  This means there is a chance of an event involving your heart that cannot be completely excluded that can occur in 1 out of 100 people.  All efforts were made to identify the source of your symptoms and identify any life-threatening conditions.  These life-threatening conditions that were attempted to be identified and medically managed/treated include but not limited to a blood clot in the lungs, inflammation/fluid around your heart, air between your lungs and chest wall, lung infection/pneumonia, and air/fluid (infection) within the middle part of your chest.  Additionally, you may be experiencing symptoms that are non-life-threatening but are uncomfortable and disruptive to your everyday life.  All efforts were made to manage your symptoms while in the emergency department along with appropriate at home therapy for symptomatic management during your recovery. Please be aware that not all of the conditions explained/discussed in these discharge instructions are applicable to your condition but encompass many of the conditions that were evaluated for during your ED encounter.      During your evaluation and assessment, all measures were taken to evaluate you and address your health concerns to identify dangerous and life threatening health conditions. It is not possible to identify all health conditions or pathologies and  eliminate any chance of unfavorable outcomes while in the Emergency Department. My team encourages you to be vigilant with your health and follow-up with the appropriate providers outlined in your discharge paperwork. Please return to the Emergency Department if you feel that your health has not improved or experiencing worsening symptoms.    Special instructions please take the medications as they are prescribed.  Please make a follow-up point with your primary care physician to further manage her symptoms and address your health concerns.  Please make a follow-up appoint with your cardiologist to review today's ED encounter and further manage your symptoms and concern for under maximized medical management of your heart failure.  Pulmonology referral was placed to assess your symptoms concerning for obstructive sleep apnea.    Incidental findings:  None

## 2024-08-26 NOTE — ED PROVIDER NOTES
HPI   Chief Complaint   Patient presents with    Chest Pain     I have chest tightness for a while and it is worse with breaching in       : # [id number]    Patient presents to ED for acute shortness of breath waking up gasping for air and coughing along with associated throat burning with minimally productive pinkish sputum.  Notes some chest pressure that is primarily upper anterior chest without any radiation, mild in severity with mild improvement since arriving the ED.  Not presently experiencing any significant shortness of breath or chest pressure at this time.  Notes no history of WILSON and does not wear any home oxygen.  Denies any associated infectious symptoms or fever/chills.  Not experiencing any significant diaphoresis or associated nausea.  Denies any substance use.  Denies any other significant systemic symptoms or complaints.              Patient History   Past Medical History:   Diagnosis Date    Diabetes mellitus (Multi)     Hypertension      Past Surgical History:   Procedure Laterality Date    CARDIAC CATHETERIZATION N/A 4/29/2024    Procedure: Left Heart Cath;  Surgeon: Kyle De La Cruz DO;  Location: [facility] Cardiac Cath Lab;  Service: Cardiovascular;  Laterality: N/A;     No family history on file.  Social History     Tobacco Use    Smoking status: Former     Types: Cigarettes    Smokeless tobacco: Never   Substance Use Topics    Alcohol use: Not Currently    Drug use: Defer       Physical Exam   ED Triage Vitals [08/26/24 0755]   Temperature Heart Rate Respirations BP   36.8 °C (98.2 °F) 85 18 (!) 159/104      Pulse Ox Temp Source Heart Rate Source Patient Position   99 % Temporal Monitor Sitting      BP Location FiO2 (%)     Left arm --       Physical Exam  Vitals and nursing note reviewed.   Constitutional:       General: He is not in acute distress.     Appearance: Normal appearance. He is not ill-appearing.   HENT:      Mouth/Throat:      Mouth: Mucous membranes are moist.      Pharynx:  Oropharynx is clear. Uvula swelling present.      Comments: Mild uvula swelling with minimal petechiae likely consistent with WILSON, remaining posterior oropharyngeal airway patent without appreciable angioedema, oral secretions well-controlled  Eyes:      Extraocular Movements: Extraocular movements intact.      Pupils: Pupils are equal, round, and reactive to light.   Cardiovascular:      Rate and Rhythm: Normal rate and regular rhythm.      Pulses: Normal pulses.           Radial pulses are 2+ on the right side and 2+ on the left side.        Dorsalis pedis pulses are 2+ on the right side and 2+ on the left side.      Heart sounds: Normal heart sounds. Heart sounds not distant. No murmur heard.     Comments: Equal and symmetrical distal pulses  Pulmonary:      Effort: Pulmonary effort is normal. No respiratory distress.      Breath sounds: Normal breath sounds. No decreased air movement. No decreased breath sounds.      Comments: Speaking in complete sentences, no conversational dyspnea, no requiring supplemental O2  Chest:      Chest wall: No tenderness.   Musculoskeletal:      Right lower leg: No edema.      Left lower leg: No edema.   Skin:     General: Skin is warm and dry.      Coloration: Skin is not ashen, cyanotic or pale.   Neurological:      General: No focal deficit present.      Mental Status: He is alert and oriented to person, place, and time.           ED Course & MDM   ED Course as of 08/28/24 0919   Mon Aug 26, 2024   0804 VS notable for mild to moderately hypertensive on presentation in setting of reported chest pain, many VSS [BC]   0805 I personally reviewed and interpreted the EKG @0805: NSR 86, normal axis, no appreciable ischemia, LBBB, prolonged Qtc 516, no prior EKG available for review, and does not meet modified Sgarbossa criteria for ischemia. [BC]   0907 CBC with Differential(!)  Mild normocytic anemia, otherwise unremarkable and noncontributory to Patient condition/symptoms   [BC]    0907 Comprehensive Metabolic Panel(!)  unremarkable and noncontributory to Patient condition/symptoms   [BC]   0907 NT Pro-BNP(!)  Significantly elevated.  A prior in the setting of reported orthopnea and and chest pressure, not requiring supplemental O2 and does not appear to be fluid overload, consistent with known ischemic cardiomyopathy [BC]   0908 Troponin T Series, High Sensitivity (0, 2HR, 6HR)(!!)  Elevated although flat compared to prior labs in the setting of reported orthopnea and chest pressure, EKG unremarkable for ischemic changes, will trend [BC]   0908 Comprehensive Metabolic Panel(!)  Mild to moderate hyperglycemia without evidence of AGMA, otherwise unremarkable and noncontributory to Patient condition/symptoms   [BC]   1042 Troponin T Series, High Sensitivity (0, 2HR, 6HR)(!!)  Stable/flat, no concern for ACS/MI [BC]   1045 XR chest 2 views  IMPRESSION:  No acute pathologic findings are identified on this exam.  Cardiomegaly.    I have personally reviewed and interpreted the images, no evidence of acute cardiopulmonary pathologies, baseline enlarged cardiac silhouette, evidence of mild vascular congestion consistent with mild pulmonary edema, agree with. radiology final read   [BC]   1046 On reassessment patient endorses not experiencing any symptoms post ED interventions, endorsing any new or significantly worsening symptoms.  Not requiring supplemental O2, tolerated p.o. without symptoms, able to ambulate without any significant symptom exacerbation.   [BC]      ED Course User Index  [BC] Sylvester Romeo MD         Diagnoses as of 08/28/24 0919   Acute chest pain   Orthopnea   Acute cough   CHF (congestive heart failure), NYHA class I, acute on chronic, combined (Multi)   NSTEMI (non-ST elevated myocardial infarction) (Multi)   Elevated troponin   Elevated brain natriuretic peptide (BNP) level   Chest pain due to GERD                 No data recorded     [place]bel Coma Scale Score: 15  (08/26/24 0758 : Joyce Isbell, ROBERT)                           Medical Decision Making  Patient presented to the ED for acute shortness of breath/orthopnea with mild minimally productive cough and chest pressure/burning with concerning PMHx of NSTEMI, CHF, HTN, orthopnea.  I personally reviewed and interpreted VS, labs, images, and EKG which are as stated above in the ED course.    Assessment/evaluation consistent with likely undiagnosed WILSON and chronic NICM and a moderate to severe HF with evidence of mild to moderate acute on chronic.  No concerning history, clinical evidence/work-up, or exam findings for the concerning differentials of ACS/MI, fluid overload, PTX, focal/multifocal lobar PNA, significant lecture lites metabolic derangement, acute RF/dysfunction, PE.  These conditions have been thoroughly evaluated and determined to be sufficiently unlikely to be the etiology of patient's presenting symptoms.    Scores Heart 4 (12-16% MACE)    Prescribed Tylenol and Pepcid for symptomatic management.  After receiving an appropriate exam, clinical work-up, and necessary interventions/treatment, Patient is appropriate for discharge at this time due to no concerning symptoms or findings requiring hospitalization for stabilization or further interrogation/management and is appropriate for management of symptoms at home with recommended appropriate outpatient follow-up.  Patient was encouraged to ask any questions or for clarification of today's ED encounter.  Patient is agreeable to plan of care. Discussed with Patient today's results/findings and likely diagnosis, provided appropriate RTED precautions along with recommended follow-up with PCP, Cardiologist/Cardiology, and Pulmonologist/Pulmonology.      Per Chart Review: ED encounter and hospitalization 4/26/2024 for shortness of breath and heart failure, cardiology assessed patient took patient to cardiac Cath Lab on 4/29/2024 and determined patient has significant  HF with EF 25 to 30% and mild to moderate nonobstructive CAD, patient was started on GDMT, cardiology office follow-up visit on 5/13/2024 notes no significant concerning health issues, VSS, exam not performed, plan to continue GDMT and plan for repeat echo in 2 months with RTO 3 months.      Parts of this chart have been completed using voice recognition software. Please excuse any errors of transcription.    Problems Addressed:  Acute cough: acute illness or injury  CHF (congestive heart failure), NYHA class I, acute on chronic, combined (Multi): chronic illness or injury  Elevated brain natriuretic peptide (BNP) level: acute illness or injury  Orthopnea: acute illness or injury    Amount and/or Complexity of Data Reviewed  External Data Reviewed: notes.     Details: See MDM  Labs: ordered. Decision-making details documented in ED Course.  Radiology: ordered and independent interpretation performed. Decision-making details documented in ED Course.  ECG/medicine tests: ordered and independent interpretation performed. Decision-making details documented in ED Course.        Procedure  Procedures     Sylvester Romeo MD  08/28/24 0929

## 2024-09-17 ENCOUNTER — HOSPITAL ENCOUNTER (EMERGENCY)
Facility: HOSPITAL | Age: 59
Discharge: HOME | End: 2024-09-17
Attending: STUDENT IN AN ORGANIZED HEALTH CARE EDUCATION/TRAINING PROGRAM

## 2024-09-17 ENCOUNTER — APPOINTMENT (OUTPATIENT)
Dept: RADIOLOGY | Facility: HOSPITAL | Age: 59
End: 2024-09-17

## 2024-09-17 VITALS
TEMPERATURE: 98.2 F | RESPIRATION RATE: 18 BRPM | DIASTOLIC BLOOD PRESSURE: 106 MMHG | WEIGHT: 205.03 LBS | BODY MASS INDEX: 30.37 KG/M2 | OXYGEN SATURATION: 96 % | HEART RATE: 80 BPM | SYSTOLIC BLOOD PRESSURE: 153 MMHG | HEIGHT: 69 IN

## 2024-09-17 DIAGNOSIS — I10 PRIMARY HYPERTENSION: ICD-10-CM

## 2024-09-17 DIAGNOSIS — G47.9 DIFFICULTY SLEEPING: ICD-10-CM

## 2024-09-17 DIAGNOSIS — R06.01 ORTHOPNEA: Primary | ICD-10-CM

## 2024-09-17 DIAGNOSIS — E78.2 MIXED HYPERLIPIDEMIA: ICD-10-CM

## 2024-09-17 DIAGNOSIS — I50.9 HEART FAILURE: ICD-10-CM

## 2024-09-17 DIAGNOSIS — I50.43 CHF (CONGESTIVE HEART FAILURE), NYHA CLASS I, ACUTE ON CHRONIC, COMBINED: ICD-10-CM

## 2024-09-17 LAB
ANION GAP SERPL CALC-SCNC: 12 MMOL/L
BASOPHILS # BLD AUTO: 0.06 X10*3/UL (ref 0–0.1)
BASOPHILS NFR BLD AUTO: 0.7 %
BUN SERPL-MCNC: 15 MG/DL (ref 8–25)
CALCIUM SERPL-MCNC: 9.1 MG/DL (ref 8.5–10.4)
CHLORIDE SERPL-SCNC: 106 MMOL/L (ref 97–107)
CO2 SERPL-SCNC: 22 MMOL/L (ref 24–31)
CREAT SERPL-MCNC: 1.2 MG/DL (ref 0.4–1.6)
D DIMER PPP FEU-MCNC: 0.28 MG/L FEU (ref 0.19–0.5)
EGFRCR SERPLBLD CKD-EPI 2021: 71 ML/MIN/1.73M*2
EOSINOPHIL # BLD AUTO: 0.18 X10*3/UL (ref 0–0.7)
EOSINOPHIL NFR BLD AUTO: 2.2 %
ERYTHROCYTE [DISTWIDTH] IN BLOOD BY AUTOMATED COUNT: 13.9 % (ref 11.5–14.5)
GLUCOSE SERPL-MCNC: 130 MG/DL (ref 65–99)
HCT VFR BLD AUTO: 38.9 % (ref 41–52)
HGB BLD-MCNC: 12.5 G/DL (ref 13.5–17.5)
IMM GRANULOCYTES # BLD AUTO: 0.02 X10*3/UL (ref 0–0.7)
IMM GRANULOCYTES NFR BLD AUTO: 0.2 % (ref 0–0.9)
LYMPHOCYTES # BLD AUTO: 3.01 X10*3/UL (ref 1.2–4.8)
LYMPHOCYTES NFR BLD AUTO: 36.5 %
MCH RBC QN AUTO: 27.9 PG (ref 26–34)
MCHC RBC AUTO-ENTMCNC: 32.1 G/DL (ref 32–36)
MCV RBC AUTO: 87 FL (ref 80–100)
MONOCYTES # BLD AUTO: 0.65 X10*3/UL (ref 0.1–1)
MONOCYTES NFR BLD AUTO: 7.9 %
NEUTROPHILS # BLD AUTO: 4.33 X10*3/UL (ref 1.2–7.7)
NEUTROPHILS NFR BLD AUTO: 52.5 %
NRBC BLD-RTO: 0 /100 WBCS (ref 0–0)
NT-PROBNP SERPL-MCNC: 1269 PG/ML (ref 0–177)
PLATELET # BLD AUTO: 239 X10*3/UL (ref 150–450)
POTASSIUM SERPL-SCNC: 4.1 MMOL/L (ref 3.4–5.1)
RBC # BLD AUTO: 4.48 X10*6/UL (ref 4.5–5.9)
SODIUM SERPL-SCNC: 140 MMOL/L (ref 133–145)
TROPONIN T SERPL-MCNC: 14 NG/L
TROPONIN T SERPL-MCNC: 16 NG/L
WBC # BLD AUTO: 8.3 X10*3/UL (ref 4.4–11.3)

## 2024-09-17 PROCEDURE — 85300 ANTITHROMBIN III ACTIVITY: CPT | Performed by: STUDENT IN AN ORGANIZED HEALTH CARE EDUCATION/TRAINING PROGRAM

## 2024-09-17 PROCEDURE — 2500000001 HC RX 250 WO HCPCS SELF ADMINISTERED DRUGS (ALT 637 FOR MEDICARE OP): Performed by: STUDENT IN AN ORGANIZED HEALTH CARE EDUCATION/TRAINING PROGRAM

## 2024-09-17 PROCEDURE — 83880 ASSAY OF NATRIURETIC PEPTIDE: CPT | Performed by: STUDENT IN AN ORGANIZED HEALTH CARE EDUCATION/TRAINING PROGRAM

## 2024-09-17 PROCEDURE — 71046 X-RAY EXAM CHEST 2 VIEWS: CPT

## 2024-09-17 PROCEDURE — 36415 COLL VENOUS BLD VENIPUNCTURE: CPT | Performed by: STUDENT IN AN ORGANIZED HEALTH CARE EDUCATION/TRAINING PROGRAM

## 2024-09-17 PROCEDURE — 99283 EMERGENCY DEPT VISIT LOW MDM: CPT

## 2024-09-17 PROCEDURE — 80048 BASIC METABOLIC PNL TOTAL CA: CPT | Performed by: STUDENT IN AN ORGANIZED HEALTH CARE EDUCATION/TRAINING PROGRAM

## 2024-09-17 PROCEDURE — 84484 ASSAY OF TROPONIN QUANT: CPT | Performed by: STUDENT IN AN ORGANIZED HEALTH CARE EDUCATION/TRAINING PROGRAM

## 2024-09-17 PROCEDURE — 71046 X-RAY EXAM CHEST 2 VIEWS: CPT | Performed by: RADIOLOGY

## 2024-09-17 PROCEDURE — 85025 COMPLETE CBC W/AUTO DIFF WBC: CPT | Performed by: STUDENT IN AN ORGANIZED HEALTH CARE EDUCATION/TRAINING PROGRAM

## 2024-09-17 PROCEDURE — 2500000002 HC RX 250 W HCPCS SELF ADMINISTERED DRUGS (ALT 637 FOR MEDICARE OP, ALT 636 FOR OP/ED): Performed by: STUDENT IN AN ORGANIZED HEALTH CARE EDUCATION/TRAINING PROGRAM

## 2024-09-17 RX ORDER — HYDROXYZINE PAMOATE 25 MG/1
25 CAPSULE ORAL ONCE
Status: COMPLETED | OUTPATIENT
Start: 2024-09-17 | End: 2024-09-17

## 2024-09-17 RX ORDER — AMLODIPINE BESYLATE 5 MG/1
5 TABLET ORAL DAILY
Qty: 90 TABLET | Refills: 0 | Status: SHIPPED | OUTPATIENT
Start: 2024-09-17 | End: 2024-12-16

## 2024-09-17 RX ORDER — GUAIFENESIN 100 MG/5ML
200 SOLUTION ORAL ONCE
Status: COMPLETED | OUTPATIENT
Start: 2024-09-17 | End: 2024-09-17

## 2024-09-17 RX ORDER — FUROSEMIDE 20 MG/1
20 TABLET ORAL DAILY
Qty: 90 TABLET | Refills: 0 | Status: SHIPPED | OUTPATIENT
Start: 2024-09-17 | End: 2024-12-16

## 2024-09-17 RX ORDER — GUAIFENESIN 600 MG/1
1200 TABLET, EXTENDED RELEASE ORAL 2 TIMES DAILY
Qty: 60 TABLET | Refills: 0 | Status: SHIPPED | OUTPATIENT
Start: 2024-09-17 | End: 2024-09-17

## 2024-09-17 RX ORDER — GUAIFENESIN 600 MG/1
1200 TABLET, EXTENDED RELEASE ORAL 2 TIMES DAILY
Qty: 60 TABLET | Refills: 0 | Status: SHIPPED | OUTPATIENT
Start: 2024-09-17 | End: 2024-10-17

## 2024-09-17 RX ORDER — CARVEDILOL 3.12 MG/1
3.12 TABLET ORAL
Qty: 180 TABLET | Refills: 0 | Status: SHIPPED | OUTPATIENT
Start: 2024-09-17 | End: 2024-12-16

## 2024-09-17 RX ORDER — ATORVASTATIN CALCIUM 40 MG/1
40 TABLET, FILM COATED ORAL NIGHTLY
Qty: 90 TABLET | Refills: 0 | Status: SHIPPED | OUTPATIENT
Start: 2024-09-17

## 2024-09-17 ASSESSMENT — PAIN - FUNCTIONAL ASSESSMENT: PAIN_FUNCTIONAL_ASSESSMENT: 0-10

## 2024-09-17 ASSESSMENT — PAIN SCALES - GENERAL: PAINLEVEL_OUTOF10: 2

## 2024-09-17 ASSESSMENT — PAIN DESCRIPTION - LOCATION: LOCATION: THROAT

## 2024-09-17 NOTE — DISCHARGE INSTRUCTIONS
You are seen the emergency room today for difficulty sleeping related to what I suspect is extra fluid in your chest.  This is related to your heart.  You were previously seen by cardiologist here in [place] and I strongly recommend that you see a cardiologist and a regular doctor when you return back home to [place].  I have prescribed your medications again and have sent you 3 months worth of supply.  I have looked up these medications and have found that [facility] is your cheapest option.  Please take the prescription to any local [facility] to have them filled.    Your Furosemide is to remove extra fluid.  Your Carvedilol is to protect your heart and help your blood pressure.  Your Amlodipine is for blood pressure  Your Atorvastatin is for cholesterol.    Hoy lo atendieron en la jadon de emergencias por dificultad para dormir relacionada con lo que sospecho que es líquido adicional en craig pecho. [place] está relacionado con craig corazón. Anteriormente lo atendió un cardiólogo aquí en [place] y le recomiendo enfáticamente que zaida a un cardiólogo y a un médico de cabecera cuando regrese a craig hogar en [place]. Le receté nuevamente anselmo medicamentos y le envié un suministro para 3 meses. Busqué estos medicamentos y descubrí que [facility] es craig opción más económica. Lleve la receta a cualquier [facility] local para que se la repongan.    Craig furosemida es para eliminar el líquido adicional.  Craig carvedilol es para proteger craig corazón y ayudar a craig presión arterial.  Craig amlodipino es para la presión arterial.  Craig atorvastatina es para el colesterol.

## 2024-09-17 NOTE — ED PROVIDER NOTES
History of Present Illness     History provided by: Patient  Limitations to History: None  External Records Reviewed with Brief Summary:  Prior inpatient and ED notes    HPI:  Tomas Hough is a 59 y.o. male presents with fluid in his throat.  Patient states that he has had increased phlegm, noting it to be mildly pink, has been ongoing for the past month.  Patient states last time he had phlegm like this he was diagnosed with a heart problem.  Patient notes that he has been having difficulty sleeping secondary to his phlegm because every time he lays back, he feels as if he is choking and he can hear rattling in his throat.  No chest pain.  No shortness of breath with exertion.    Physical Exam   Triage vitals:  T 36.8 °C (98.2 °F)  HR 90  BP (!) 144/100  RR 18  O2 97 % None (Room air)    Physical Exam  Vitals and nursing note reviewed.   Constitutional:       General: He is not in acute distress.     Appearance: He is not ill-appearing.   HENT:      Head: Normocephalic and atraumatic.      Mouth/Throat:      Mouth: Mucous membranes are moist.      Pharynx: Oropharynx is clear.      Comments: Normal phonation  Eyes:      Extraocular Movements: Extraocular movements intact.      Conjunctiva/sclera: Conjunctivae normal.      Pupils: Pupils are equal, round, and reactive to light.   Cardiovascular:      Rate and Rhythm: Normal rate and regular rhythm.   Pulmonary:      Effort: Pulmonary effort is normal. No respiratory distress.      Breath sounds: Normal breath sounds.   Abdominal:      General: There is no distension.      Palpations: Abdomen is soft.      Tenderness: There is no abdominal tenderness. There is no guarding or rebound.   Musculoskeletal:         General: No swelling or deformity. Normal range of motion.      Cervical back: Normal range of motion and neck supple.      Right lower leg: No edema.      Left lower leg: No edema.   Skin:     General: Skin is warm and dry.      Capillary Refill: Capillary  refill takes less than 2 seconds.   Neurological:      General: No focal deficit present.      Mental Status: He is alert and oriented to person, place, and time. Mental status is at baseline.   Psychiatric:         Mood and Affect: Mood normal.         Behavior: Behavior normal.          Medical Decision Making & ED Course   Medical Decision Makin y.o. male presents with increased phlegm.  Considered pharyngitis, CHF, ACS.  Chart review reveals patient was seen in 2024 and diagnosed with a remote MI resulting in acute heart failure.  Patient was then seen outpatient by cardiology and given prescription for furosemide.  Patient states he has completed all the medications given to him by his cardiologist.  Patient currently denies chest pain.  Highly suspect patient currently has increased fluid overload secondary to lack of furosemide and medication noncompliance due to misunderstanding.  Labs reveal a stable BNP of 1200 and a troponin of 16 which is stable compared to prior.  Chest x-ray with mild edema.  Patient does not reside in [place], currently lives in [place] but travels up to [place] several times a year due to work.  Discussed with patient and attempted to explain prior visit with heart attack diagnosis, heart failure, and need for maintenance medications.  At this time we will prescribe patient is prior home medications for 90 days and encouraged him to see a primary care doctor when he returns to [place] in December.  Treated with guaifenesin here in the emergency department given reports of increased phlegm.  Patient also given hydroxyzine to assist with sleep aid.  Patient also given sleeping medication to assist with difficulty sleeping at home.  ----    Social Determinants of Health which Significantly Impact Care: Difficulty obtaining outpatient follow-up, Difficulty paying for/obtaining medications, and frequent moving due to job  The following actions were taken to address these social  determinants: Patient given discount coupons for medications    EKG Independent Interpretation: EKG interpreted by myself. Please see ED Course for full interpretation.    Independent Result Review and Interpretation: Relevant laboratory and radiographic results were reviewed and independently interpreted by myself.  As necessary, they are commented on in the ED Course.    Chronic conditions affecting the patient's care: As documented above in MDM    The patient was discussed with the following consultants/services: None    Care Considerations: As documented above in MDM    ED Course:  ED Course as of 09/17/24 0414   Tue Sep 17, 2024   0209 XR chest 2 views  IMPRESSION:  1.  Non-specific interstitial prominence which could be related to edema chronic lung changes. No focal consolidation. [JM]   0314 D-Dimer Non VTE, Quant (mg/L FEU): 0.28 []   0329 PROBNP(!): 1,269  Approximately patient's baseline for the past 3 evaluations []   0334 Troponin T, High Sensitivity(!!): 16  Flat compared to prior []      ED Course User Index  [] Lilian Dow MD         Diagnoses as of 09/17/24 0414   Orthopnea   Difficulty sleeping       Procedures   Procedures    Lilian Dow MD  Emergency Medicine     Lilian Dow MD  09/17/24 0424

## 2025-03-11 ENCOUNTER — APPOINTMENT (OUTPATIENT)
Dept: CARDIOLOGY | Facility: HOSPITAL | Age: 60
End: 2025-03-11

## 2025-03-11 ENCOUNTER — APPOINTMENT (OUTPATIENT)
Dept: RADIOLOGY | Facility: HOSPITAL | Age: 60
End: 2025-03-11

## 2025-03-11 ENCOUNTER — HOSPITAL ENCOUNTER (EMERGENCY)
Facility: HOSPITAL | Age: 60
Discharge: HOME | End: 2025-03-11
Attending: STUDENT IN AN ORGANIZED HEALTH CARE EDUCATION/TRAINING PROGRAM

## 2025-03-11 VITALS
SYSTOLIC BLOOD PRESSURE: 137 MMHG | RESPIRATION RATE: 22 BRPM | HEIGHT: 69 IN | WEIGHT: 201.4 LBS | TEMPERATURE: 98.6 F | OXYGEN SATURATION: 98 % | BODY MASS INDEX: 29.83 KG/M2 | HEART RATE: 81 BPM | DIASTOLIC BLOOD PRESSURE: 91 MMHG

## 2025-03-11 DIAGNOSIS — I50.43 CHF (CONGESTIVE HEART FAILURE), NYHA CLASS I, ACUTE ON CHRONIC, COMBINED: ICD-10-CM

## 2025-03-11 DIAGNOSIS — R05.3 CHRONIC COUGH: ICD-10-CM

## 2025-03-11 DIAGNOSIS — R07.9 CHEST PAIN, UNSPECIFIED TYPE: Primary | ICD-10-CM

## 2025-03-11 DIAGNOSIS — I10 PRIMARY HYPERTENSION: ICD-10-CM

## 2025-03-11 LAB
ALBUMIN SERPL BCP-MCNC: 4.3 G/DL (ref 3.4–5)
ALP SERPL-CCNC: 97 U/L (ref 33–120)
ALT SERPL W P-5'-P-CCNC: 6 U/L (ref 10–52)
ANION GAP SERPL CALCULATED.3IONS-SCNC: 13 MMOL/L (ref 10–20)
AST SERPL W P-5'-P-CCNC: 12 U/L (ref 9–39)
ATRIAL RATE: 93 BPM
BASOPHILS # BLD AUTO: 0.06 X10*3/UL (ref 0–0.1)
BASOPHILS NFR BLD AUTO: 0.8 %
BILIRUB SERPL-MCNC: 0.6 MG/DL (ref 0–1.2)
BNP SERPL-MCNC: 78 PG/ML (ref 0–99)
BUN SERPL-MCNC: 12 MG/DL (ref 6–23)
CALCIUM SERPL-MCNC: 10.2 MG/DL (ref 8.6–10.3)
CARDIAC TROPONIN I PNL SERPL HS: 10 NG/L (ref 0–20)
CARDIAC TROPONIN I PNL SERPL HS: 11 NG/L (ref 0–20)
CHLORIDE SERPL-SCNC: 101 MMOL/L (ref 98–107)
CO2 SERPL-SCNC: 27 MMOL/L (ref 21–32)
CREAT SERPL-MCNC: 1.18 MG/DL (ref 0.5–1.3)
EGFRCR SERPLBLD CKD-EPI 2021: 72 ML/MIN/1.73M*2
EOSINOPHIL # BLD AUTO: 0.05 X10*3/UL (ref 0–0.7)
EOSINOPHIL NFR BLD AUTO: 0.7 %
ERYTHROCYTE [DISTWIDTH] IN BLOOD BY AUTOMATED COUNT: 13.1 % (ref 11.5–14.5)
GLUCOSE SERPL-MCNC: 198 MG/DL (ref 74–99)
HCT VFR BLD AUTO: 42.5 % (ref 41–52)
HGB BLD-MCNC: 14.1 G/DL (ref 13.5–17.5)
IMM GRANULOCYTES # BLD AUTO: 0.01 X10*3/UL (ref 0–0.7)
IMM GRANULOCYTES NFR BLD AUTO: 0.1 % (ref 0–0.9)
LYMPHOCYTES # BLD AUTO: 2.48 X10*3/UL (ref 1.2–4.8)
LYMPHOCYTES NFR BLD AUTO: 32.8 %
MCH RBC QN AUTO: 27.3 PG (ref 26–34)
MCHC RBC AUTO-ENTMCNC: 33.2 G/DL (ref 32–36)
MCV RBC AUTO: 82 FL (ref 80–100)
MONOCYTES # BLD AUTO: 0.42 X10*3/UL (ref 0.1–1)
MONOCYTES NFR BLD AUTO: 5.6 %
NEUTROPHILS # BLD AUTO: 4.54 X10*3/UL (ref 1.2–7.7)
NEUTROPHILS NFR BLD AUTO: 60 %
NRBC BLD-RTO: 0 /100 WBCS (ref 0–0)
P AXIS: 52 DEGREES
P OFFSET: 220 MS
P ONSET: 149 MS
PLATELET # BLD AUTO: 221 X10*3/UL (ref 150–450)
POTASSIUM SERPL-SCNC: 3.7 MMOL/L (ref 3.5–5.3)
PR INTERVAL: 174 MS
PROT SERPL-MCNC: 7.5 G/DL (ref 6.4–8.2)
Q ONSET: 236 MS
QRS COUNT: 15 BEATS
QRS DURATION: 172 MS
QT INTERVAL: 398 MS
QTC CALCULATION(BAZETT): 494 MS
QTC FREDERICIA: 461 MS
R AXIS: -25 DEGREES
RBC # BLD AUTO: 5.16 X10*6/UL (ref 4.5–5.9)
SODIUM SERPL-SCNC: 137 MMOL/L (ref 136–145)
T AXIS: 104 DEGREES
T OFFSET: 435 MS
VENTRICULAR RATE: 93 BPM
WBC # BLD AUTO: 7.6 X10*3/UL (ref 4.4–11.3)

## 2025-03-11 PROCEDURE — 84484 ASSAY OF TROPONIN QUANT: CPT | Performed by: STUDENT IN AN ORGANIZED HEALTH CARE EDUCATION/TRAINING PROGRAM

## 2025-03-11 PROCEDURE — 71046 X-RAY EXAM CHEST 2 VIEWS: CPT | Mod: FOREIGN READ | Performed by: RADIOLOGY

## 2025-03-11 PROCEDURE — 85025 COMPLETE CBC W/AUTO DIFF WBC: CPT | Performed by: STUDENT IN AN ORGANIZED HEALTH CARE EDUCATION/TRAINING PROGRAM

## 2025-03-11 PROCEDURE — 36415 COLL VENOUS BLD VENIPUNCTURE: CPT | Performed by: STUDENT IN AN ORGANIZED HEALTH CARE EDUCATION/TRAINING PROGRAM

## 2025-03-11 PROCEDURE — 99285 EMERGENCY DEPT VISIT HI MDM: CPT | Mod: 25 | Performed by: STUDENT IN AN ORGANIZED HEALTH CARE EDUCATION/TRAINING PROGRAM

## 2025-03-11 PROCEDURE — 83880 ASSAY OF NATRIURETIC PEPTIDE: CPT | Performed by: STUDENT IN AN ORGANIZED HEALTH CARE EDUCATION/TRAINING PROGRAM

## 2025-03-11 PROCEDURE — 71046 X-RAY EXAM CHEST 2 VIEWS: CPT

## 2025-03-11 PROCEDURE — 80053 COMPREHEN METABOLIC PANEL: CPT | Performed by: STUDENT IN AN ORGANIZED HEALTH CARE EDUCATION/TRAINING PROGRAM

## 2025-03-11 PROCEDURE — 93005 ELECTROCARDIOGRAM TRACING: CPT

## 2025-03-11 PROCEDURE — 2500000001 HC RX 250 WO HCPCS SELF ADMINISTERED DRUGS (ALT 637 FOR MEDICARE OP): Performed by: STUDENT IN AN ORGANIZED HEALTH CARE EDUCATION/TRAINING PROGRAM

## 2025-03-11 RX ORDER — NAPROXEN SODIUM 220 MG/1
324 TABLET, FILM COATED ORAL ONCE
Status: COMPLETED | OUTPATIENT
Start: 2025-03-11 | End: 2025-03-11

## 2025-03-11 RX ORDER — CARVEDILOL 3.12 MG/1
3.12 TABLET ORAL
Qty: 20 TABLET | Refills: 0 | Status: SHIPPED | OUTPATIENT
Start: 2025-03-11 | End: 2025-03-21

## 2025-03-11 RX ORDER — AMLODIPINE BESYLATE 5 MG/1
5 TABLET ORAL DAILY
Qty: 10 TABLET | Refills: 0 | Status: SHIPPED | OUTPATIENT
Start: 2025-03-11 | End: 2025-03-21

## 2025-03-11 RX ORDER — OMEPRAZOLE 20 MG/1
20 CAPSULE, DELAYED RELEASE ORAL DAILY
Qty: 30 CAPSULE | Refills: 0 | Status: SHIPPED | OUTPATIENT
Start: 2025-03-11 | End: 2025-04-10

## 2025-03-11 RX ADMIN — ASPIRIN 81 MG CHEWABLE TABLET 324 MG: 81 TABLET CHEWABLE at 12:00

## 2025-03-11 ASSESSMENT — PAIN SCALES - GENERAL: PAINLEVEL_OUTOF10: 0 - NO PAIN

## 2025-03-11 ASSESSMENT — PAIN - FUNCTIONAL ASSESSMENT: PAIN_FUNCTIONAL_ASSESSMENT: 0-10

## 2025-03-11 ASSESSMENT — HEART SCORE
ECG: NON-SPECIFIC REPOLARIZATION DISTURBANCE
RISK FACTORS: 1-2 RISK FACTORS
TROPONIN: LESS THAN OR EQUAL TO NORMAL LIMIT
AGE: 45-64
HISTORY: SLIGHTLY SUSPICIOUS
HEART SCORE: 3

## 2025-03-11 ASSESSMENT — PAIN DESCRIPTION - LOCATION: LOCATION: CHEST

## 2025-03-11 NOTE — ED PROVIDER NOTES
HPI   Chief Complaint   Patient presents with    Chest Pain       Patient is a 59-year-old male that presents emergency department for evaluation of some intermittent chest discomfort as well as cough with phlegm.  Patient states that for the last several weeks he intermittently has been getting a sharp chest discomfort in the center of the chest.  Comes and goes.  He states he does not have the pain today.  He denies does admit to some mild intermittent shortness of breath but also states that that is not going on today.  He states he has been having cough with productive phlegm.  He states this is also been going on for the last several months.  He denies fever, chills, nausea, vomiting, lightheadedness or dizziness.      History provided by:  Patient   used: Yes            Patient History   Past Medical History:   Diagnosis Date    Diabetes mellitus (Multi)     Hypertension      Past Surgical History:   Procedure Laterality Date    CARDIAC CATHETERIZATION N/A 4/29/2024    Procedure: Left Heart Cath;  Surgeon: Kyle De La Cruz DO;  Location: [facility] Card[facility];  Service: Cardiovascular;  Laterality: N/A;     No family history on file.  Social History     Tobacco Use    Smoking status: Former     Types: Cigarettes    Smokeless tobacco: Never   Substance Use Topics    Alcohol use: Not Currently    Drug use: Defer       Physical Exam   ED Triage Vitals [03/11/25 1129]   Temperature Heart Rate Respirations BP   37 °C (98.6 °F) 92 17 (!) 139/95      Pulse Ox Temp Source Heart Rate Source Patient Position   96 % Tympanic Monitor Sitting      BP Location FiO2 (%)     Right arm --       Physical Exam  Vitals and nursing note reviewed.   Constitutional:       General: He is not in acute distress.     Appearance: He is well-developed. He is not ill-appearing.   HENT:      Head: Normocephalic and atraumatic.   Eyes:      Extraocular Movements: Extraocular movements intact.      Pupils: Pupils are equal,  round, and reactive to light.   Neck:      Vascular: No JVD.   Cardiovascular:      Rate and Rhythm: Normal rate and regular rhythm.      Pulses:           Radial pulses are 2+ on the right side and 2+ on the left side.   Pulmonary:      Effort: Pulmonary effort is normal.      Breath sounds: No decreased breath sounds, wheezing, rhonchi or rales.   Musculoskeletal:      Cervical back: Normal range of motion.      Right lower leg: No edema.      Left lower leg: No edema.   Skin:     General: Skin is warm and dry.   Neurological:      General: No focal deficit present.      Mental Status: He is alert.       Recent Results (from the past 24 hours)   ECG 12 lead    Collection Time: 03/11/25 11:37 AM   Result Value Ref Range    Ventricular Rate 93 BPM    Atrial Rate 93 BPM    CT Interval 174 ms    QRS Duration 172 ms    QT Interval 398 ms    QTC Calculation(Bazett) 494 ms    P Axis 52 degrees    R Axis -25 degrees    T Axis 104 degrees    QRS Count 15 beats    Q Onset 236 ms    P Onset 149 ms    P Offset 220 ms    T Offset 435 ms    QTC Fredericia 461 ms   CBC with Differential    Collection Time: 03/11/25 11:59 AM   Result Value Ref Range    WBC 7.6 4.4 - 11.3 x10*3/uL    nRBC 0.0 0.0 - 0.0 /100 WBCs    RBC 5.16 4.50 - 5.90 x10*6/uL    Hemoglobin 14.1 13.5 - 17.5 g/dL    Hematocrit 42.5 41.0 - 52.0 %    MCV 82 80 - 100 fL    MCH 27.3 26.0 - 34.0 pg    MCHC 33.2 32.0 - 36.0 g/dL    RDW 13.1 11.5 - 14.5 %    Platelets 221 150 - 450 x10*3/uL    Neutrophils % 60.0 40.0 - 80.0 %    Immature Granulocytes %, Automated 0.1 0.0 - 0.9 %    Lymphocytes % 32.8 13.0 - 44.0 %    Monocytes % 5.6 2.0 - 10.0 %    Eosinophils % 0.7 0.0 - 6.0 %    Basophils % 0.8 0.0 - 2.0 %    Neutrophils Absolute 4.54 1.20 - 7.70 x10*3/uL    Immature Granulocytes Absolute, Automated 0.01 0.00 - 0.70 x10*3/uL    Lymphocytes Absolute 2.48 1.20 - 4.80 x10*3/uL    Monocytes Absolute 0.42 0.10 - 1.00 x10*3/uL    Eosinophils Absolute 0.05 0.00 - 0.70  x10*3/uL    Basophils Absolute 0.06 0.00 - 0.10 x10*3/uL   Comprehensive Metabolic Panel    Collection Time: 03/11/25 11:59 AM   Result Value Ref Range    Glucose 198 (H) 74 - 99 mg/dL    Sodium 137 136 - 145 mmol/L    Potassium 3.7 3.5 - 5.3 mmol/L    Chloride 101 98 - 107 mmol/L    Bicarbonate 27 21 - 32 mmol/L    Anion Gap 13 10 - 20 mmol/L    Urea Nitrogen 12 6 - 23 mg/dL    Creatinine 1.18 0.50 - 1.30 mg/dL    eGFR 72 >60 mL/min/1.73m*2    Calcium 10.2 8.6 - 10.3 mg/dL    Albumin 4.3 3.4 - 5.0 g/dL    Alkaline Phosphatase 97 33 - 120 U/L    Total Protein 7.5 6.4 - 8.2 g/dL    AST 12 9 - 39 U/L    Bilirubin, Total 0.6 0.0 - 1.2 mg/dL    ALT 6 (L) 10 - 52 U/L   Troponin I, High Sensitivity, Initial    Collection Time: 03/11/25 11:59 AM   Result Value Ref Range    Troponin I, High Sensitivity 10 0 - 20 ng/L   B-type natriuretic peptide    Collection Time: 03/11/25 11:59 AM   Result Value Ref Range    BNP 78 0 - 99 pg/mL   Troponin, High Sensitivity, 1 Hour    Collection Time: 03/11/25  1:13 PM   Result Value Ref Range    Troponin I, High Sensitivity 11 0 - 20 ng/L         ED Course & [facility]   ED Course as of 03/12/25 0607   Tue Mar 11, 2025   1135 EKG Time:1132  EKG Interpretation time:1135  EKG Interpretation: EKG shows normal sinus rhythm with a left bundle branch block, QTc 494, overall appears similar to prior EKG.  No evidence of active ischemia.    EKG was interpreted by myself independently [JL]      ED Course User Index  [JL] Shan Izaguirre DO         Diagnoses as of 03/12/25 0607   Chest pain, unspecified type   Chronic cough                 No data recorded                                 Medical Decision Making  Patient is a 59-year-old male that presents emergency room for evaluation of chest pain.  Patient resting comfortably and in no obvious distress on exam.  He has no chest pain at this time.  He does have normal sinus rhythm with left bundle branch block but overall appears similar to prior EKG.   Blood work was unremarkable including normal troponin of 10 retrained flat on repeat testing at 11.  He has had no chest pain throughout stay in the emergency department and remainder of blood work was unremarkable.  Chest x-ray shows no evidence of pneumonia, no thorax, wide mediastinum.  He does have a heart score of 3 and is considered very low risk of major cardiac event and is felt to be safe for outpatient follow-up.  Return precautions were discussed with patient and he is agreeable for discharge at this time.        Procedure  Procedures     Shan Izaguirre DO  03/12/25 0612

## 2025-03-11 NOTE — DISCHARGE INSTRUCTIONS
Up with cardiology as an outpatient for further evaluation and manage of her medications.  If symptoms worsen or change he can return at any time for further evaluation and treatment.

## 2025-07-10 ENCOUNTER — OFFICE VISIT (OUTPATIENT)
Dept: URGENT CARE | Age: 60
End: 2025-07-10
Payer: COMMERCIAL

## 2025-07-10 VITALS
RESPIRATION RATE: 16 BRPM | TEMPERATURE: 98.7 F | DIASTOLIC BLOOD PRESSURE: 89 MMHG | WEIGHT: 147 LBS | SYSTOLIC BLOOD PRESSURE: 143 MMHG | OXYGEN SATURATION: 98 % | HEART RATE: 72 BPM | BODY MASS INDEX: 21.71 KG/M2

## 2025-07-10 DIAGNOSIS — S63.501A SPRAIN OF RIGHT WRIST, INITIAL ENCOUNTER: ICD-10-CM

## 2025-07-10 DIAGNOSIS — R52 PAIN: Primary | ICD-10-CM

## 2025-07-10 PROCEDURE — L3908 WHO COCK-UP NONMOLDE PRE OTS: HCPCS | Performed by: PERSONAL EMERGENCY RESPONSE ATTENDANT

## 2025-07-10 PROCEDURE — 3079F DIAST BP 80-89 MM HG: CPT | Performed by: PERSONAL EMERGENCY RESPONSE ATTENDANT

## 2025-07-10 PROCEDURE — 73110 X-RAY EXAM OF WRIST: CPT | Performed by: PERSONAL EMERGENCY RESPONSE ATTENDANT

## 2025-07-10 PROCEDURE — 1036F TOBACCO NON-USER: CPT | Performed by: PERSONAL EMERGENCY RESPONSE ATTENDANT

## 2025-07-10 PROCEDURE — 99203 OFFICE O/P NEW LOW 30 MIN: CPT | Performed by: PERSONAL EMERGENCY RESPONSE ATTENDANT

## 2025-07-10 PROCEDURE — 3077F SYST BP >= 140 MM HG: CPT | Performed by: PERSONAL EMERGENCY RESPONSE ATTENDANT

## 2025-07-10 ASSESSMENT — ENCOUNTER SYMPTOMS
CONSTITUTIONAL NEGATIVE: 1
GASTROINTESTINAL NEGATIVE: 1
CARDIOVASCULAR NEGATIVE: 1
PSYCHIATRIC NEGATIVE: 1
RESPIRATORY NEGATIVE: 1

## 2025-07-10 NOTE — PROGRESS NOTES
Subjective   Patient ID: Tomas Hough is a 59 y.o. male. They present today with a chief complaint of Injury (Twisted right wrist. Pain ulnar aspect per ).    History of Present Illness  59-year-old male comes in today with a chief complaint of right wrist pain.  This will be a Workmen's Comp. claim and patient was at work when he suddenly developed right wrist pain yesterday.  It has gotten progressively worse over the last day or so.  He describes more of an overuse, twisting injury while using tools.  He did not describe a specific event.  He was brought in here by a coworker.  He does not speak English and needed a , for which we used translation services.  He has not been taking any over-the-counter medication.  He did wrap it with a piece of cloth that appeared to help.      Injury      Past Medical History  Allergies as of 07/10/2025 - Reviewed 07/10/2025   Allergen Reaction Noted    Penicillins Hives 04/26/2024       Prescriptions Prior to Admission[1]     Medical History[2]    Surgical History[3]     reports that he has quit smoking. His smoking use included cigarettes. He has never used smokeless tobacco. He reports that he does not currently use alcohol. Drug use questions deferred to the physician.    Review of Systems  Review of Systems   Constitutional: Negative.    HENT: Negative.     Respiratory: Negative.     Cardiovascular: Negative.    Gastrointestinal: Negative.    Genitourinary: Negative.    Musculoskeletal:         Right wrist pain   Skin: Negative.    Psychiatric/Behavioral: Negative.     All other systems reviewed and are negative.                                 Objective    Vitals:    07/10/25 1412   BP: 143/89   BP Location: Left arm   Patient Position: Sitting   BP Cuff Size: Adult   Pulse: 72   Resp: 16   Temp: 37.1 °C (98.7 °F)   TempSrc: Oral   SpO2: 98%   Weight: 66.7 kg (147 lb)     No LMP for male patient.    Physical Exam  Vitals and nursing note reviewed.    Constitutional:       Appearance: Normal appearance.   HENT:      Head: Normocephalic and atraumatic.   Cardiovascular:      Rate and Rhythm: Normal rate.   Pulmonary:      Effort: Pulmonary effort is normal.   Musculoskeletal:         General: Swelling and tenderness present.      Right wrist: Swelling and bony tenderness present. No deformity, effusion, lacerations, tenderness, snuff box tenderness or crepitus. Normal range of motion. Normal pulse.      Comments: Patient has minor swelling with pain noted on the ulnar aspect of the right wrist.   Skin:     General: Skin is warm and dry.   Neurological:      General: No focal deficit present.      Mental Status: He is alert and oriented to person, place, and time.   Psychiatric:         Mood and Affect: Mood normal.         Behavior: Behavior normal.         Procedures    Point of Care Test & Imaging Results from this visit  No results found for this visit on 07/10/25.   Imaging  XR wrist right 3+ views  Result Date: 7/10/2025  1. No evidence of acute fracture or dislocation.   MACRO: None.   Signed by: Jerel Perez 7/10/2025 2:16 PM Dictation workstation:   [id number]      Cardiology, Vascular, and Other Imaging  No other imaging results found for the past 2 days      Diagnostic study results (if any) were reviewed by Teddy Cheema PA-C.    Assessment/Plan   Allergies, medications, history, and pertinent labs/EKGs/Imaging reviewed by Teddy Cheema PA-C.     Medical Decision Making  59-year-old male who comes in today with a chief complaint of right wrist pain after an overuse, twisting injury that occurred yesterday.  It appeared to have lingered to today and was noticed by a coworker and he was brought into the facility by another coworker.  Translation services were used as English is not his first language.  He does speak Arabic.  With translation services he complained of right wrist pain over the ulnar aspect in particular with some radiation down  to his hand.  He had already wrapped it with a cloth, which was taken off and there was slight swelling noticed in the area without any skin break or signs of infection.  Patient was sent for an x-ray of the right wrist which revealed no acute bony abnormality.  Patient was given a wrist splint.  Workmen's Comp. information was filled out and patient is to follow-up with Workmen's Comp.  He was advised, through translation services, not to lift more than 20 pounds for the next week.  Patient stable for discharge and requested go home.  Discharge instructions were given.    Orders and Diagnoses  Diagnoses and all orders for this visit:  Pain  -     XR wrist right 3+ views; Future  Sprain of right wrist, initial encounter  -     Wrist splint, cock up      Medical Admin Record      Patient disposition: Home    Electronically signed by Teddy Cheema PA-C  3:24 PM           [1] (Not in a hospital admission)  [2]   Past Medical History:  Diagnosis Date    Diabetes mellitus (Multi)     Hypertension    [3]   Past Surgical History:  Procedure Laterality Date    CARDIAC CATHETERIZATION N/A 4/29/2024    Procedure: Left Heart Cath;  Surgeon: Kyle De La Cruz DO;  Location: [facility] [place] [facility];  Service: Cardiovascular;  Laterality: N/A;

## (undated) DEVICE — GLIDEWIRE, ANGLE, STANDARD, .035 X 180CM, 1.5MM J-TIP

## (undated) DEVICE — CATHETER, EXPO, MODEL-D, 6FR FL3.5

## (undated) DEVICE — GLIDESHEATH, SLENDER 6FR, 10CM, NITINOL KIT

## (undated) DEVICE — TR BAND, RADIAL COMPRESSION, STANDARD, 24CM

## (undated) DEVICE — GUIDEWIRE, J TIP, 3 MM, 0.035 IN X 260 CM, PTFE

## (undated) DEVICE — PACK, ANGIO P2, CUSTOM, LAKE

## (undated) DEVICE — CATHETER, EXPO, MODEL-D, MDL-D, 6FR FR5

## (undated) DEVICE — KIT, NAMIC STANDARD LEFT HEART, CUSTOM, LWMC